# Patient Record
Sex: MALE | Race: WHITE | Employment: OTHER | URBAN - NONMETROPOLITAN AREA
[De-identification: names, ages, dates, MRNs, and addresses within clinical notes are randomized per-mention and may not be internally consistent; named-entity substitution may affect disease eponyms.]

---

## 2017-05-23 ENCOUNTER — DOCTOR'S OFFICE (OUTPATIENT)
Dept: URBAN - NONMETROPOLITAN AREA CLINIC 1 | Facility: CLINIC | Age: 57
Setting detail: OPHTHALMOLOGY
End: 2017-05-23
Payer: COMMERCIAL

## 2017-05-23 DIAGNOSIS — H25.13: ICD-10-CM

## 2017-05-23 DIAGNOSIS — H21.233: ICD-10-CM

## 2017-05-23 PROCEDURE — 92004 COMPRE OPH EXAM NEW PT 1/>: CPT | Performed by: OPHTHALMOLOGY

## 2017-05-23 PROCEDURE — 76514 ECHO EXAM OF EYE THICKNESS: CPT | Performed by: OPHTHALMOLOGY

## 2017-05-23 PROCEDURE — 92083 EXTENDED VISUAL FIELD XM: CPT | Performed by: OPHTHALMOLOGY

## 2017-05-23 ASSESSMENT — REFRACTION_AUTOREFRACTION
OS_SPHERE: +1.25
OS_CYLINDER: -0.50
OD_CYLINDER: -0.25
OD_SPHERE: +1.00
OD_AXIS: 125
OS_AXIS: 75

## 2017-05-23 ASSESSMENT — CONFRONTATIONAL VISUAL FIELD TEST (CVF)
OS_FINDINGS: FULL
OD_FINDINGS: FULL

## 2017-05-23 ASSESSMENT — REFRACTION_CURRENTRX
OD_OVR_VA: 20/
OS_SPHERE: +0.50
OD_OVR_VA: 20/
OS_ADD: +2.50
OS_OVR_VA: 20/
OS_AXIS: 180
OS_OVR_VA: 20/
OD_OVR_VA: 20/
OD_CYLINDER: -0.50
OD_ADD: +2.50
OD_AXIS: 116
OS_OVR_VA: 20/
OS_CYLINDER: 0.00
OD_SPHERE: +0.50

## 2017-05-23 ASSESSMENT — VISUAL ACUITY
OD_BCVA: 20/25
OS_BCVA: 20/25

## 2017-05-23 ASSESSMENT — REFRACTION_MANIFEST
OS_VA2: 20/
OS_VA3: 20/
OU_VA: 20/
OS_VA2: 20/
OD_VA1: 20/
OS_VA2: 20/
OS_VA1: 20/
OS_VA1: 20/
OU_VA: 20/
OS_VA3: 20/
OD_VA2: 20/
OS_VA1: 20/
OS_VA3: 20/
OD_VA3: 20/
OD_VA2: 20/
OD_VA1: 20/
OD_VA2: 20/
OD_VA3: 20/
OD_VA1: 20/
OU_VA: 20/
OD_VA3: 20/

## 2017-05-23 ASSESSMENT — PACHYMETRY
OD_CT_CORRECTION: 1
OS_CT_UM: 530
OD_CT_UM: 538
OS_CT_CORRECTION: 1

## 2017-05-23 ASSESSMENT — SPHEQUIV_DERIVED
OD_SPHEQUIV: 0.875
OS_SPHEQUIV: 1

## 2017-09-27 ENCOUNTER — DOCTOR'S OFFICE (OUTPATIENT)
Dept: URBAN - NONMETROPOLITAN AREA CLINIC 1 | Facility: CLINIC | Age: 57
Setting detail: OPHTHALMOLOGY
End: 2017-09-27
Payer: COMMERCIAL

## 2017-09-27 ENCOUNTER — RX ONLY (RX ONLY)
Age: 57
End: 2017-09-27

## 2017-09-27 DIAGNOSIS — H01.9: ICD-10-CM

## 2017-09-27 PROCEDURE — 92012 INTRM OPH EXAM EST PATIENT: CPT | Performed by: OPTOMETRIST

## 2017-09-27 ASSESSMENT — REFRACTION_MANIFEST
OD_VA2: 20/
OD_VA3: 20/
OS_VA2: 20/
OD_VA2: 20/
OS_VA1: 20/
OS_VA2: 20/
OU_VA: 20/
OD_VA3: 20/
OU_VA: 20/
OD_VA3: 20/
OD_VA1: 20/
OD_VA1: 20/
OS_VA3: 20/
OD_VA2: 20/
OS_VA2: 20/
OS_VA3: 20/
OD_VA1: 20/
OS_VA1: 20/
OS_VA3: 20/
OU_VA: 20/
OS_VA1: 20/

## 2017-09-27 ASSESSMENT — VISUAL ACUITY
OD_BCVA: 20/20
OS_BCVA: 20/20-1

## 2017-09-27 ASSESSMENT — REFRACTION_CURRENTRX
OS_OVR_VA: 20/
OD_OVR_VA: 20/
OD_ADD: +2.25
OD_SPHERE: +0.50
OS_CYLINDER: -0.25
OD_OVR_VA: 20/
OS_OVR_VA: 20/
OD_VPRISM_DIRECTION: BF
OS_ADD: +2.25
OD_CYLINDER: -0.50
OD_AXIS: 110
OD_OVR_VA: 20/
OS_AXIS: 110
OS_VPRISM_DIRECTION: BF
OS_SPHERE: +0.50
OS_OVR_VA: 20/

## 2017-09-27 ASSESSMENT — REFRACTION_AUTOREFRACTION
OS_AXIS: 8
OD_SPHERE: +0.50
OD_CYLINDER: -0.25
OS_CYLINDER: -0.25
OD_AXIS: 148
OS_SPHERE: +0.75

## 2017-09-27 ASSESSMENT — LID EXAM ASSESSMENTS
OD_MEIBOMITIS: RUL 1+ 2+
OS_MEIBOMITIS: LUL 1+ 2+

## 2017-09-27 ASSESSMENT — CONFRONTATIONAL VISUAL FIELD TEST (CVF)
OS_FINDINGS: FULL
OD_FINDINGS: FULL

## 2017-09-27 ASSESSMENT — SPHEQUIV_DERIVED
OD_SPHEQUIV: 0.375
OS_SPHEQUIV: 0.625

## 2017-11-21 ENCOUNTER — DOCTOR'S OFFICE (OUTPATIENT)
Dept: URBAN - NONMETROPOLITAN AREA CLINIC 1 | Facility: CLINIC | Age: 57
Setting detail: OPHTHALMOLOGY
End: 2017-11-21
Payer: COMMERCIAL

## 2017-11-21 DIAGNOSIS — H01.9: ICD-10-CM

## 2017-11-21 DIAGNOSIS — H10.13: ICD-10-CM

## 2017-11-21 PROCEDURE — 92012 INTRM OPH EXAM EST PATIENT: CPT | Performed by: OPTOMETRIST

## 2017-11-21 ASSESSMENT — SPHEQUIV_DERIVED
OS_SPHEQUIV: 0.625
OD_SPHEQUIV: 0.375

## 2017-11-21 ASSESSMENT — REFRACTION_MANIFEST
OS_VA3: 20/
OD_VA3: 20/
OD_VA2: 20/
OS_VA2: 20/
OS_VA3: 20/
OU_VA: 20/
OS_VA2: 20/
OS_VA1: 20/
OD_VA1: 20/
OS_VA1: 20/
OD_VA2: 20/
OD_VA1: 20/
OU_VA: 20/
OD_VA3: 20/
OD_VA3: 20/
OD_VA2: 20/
OS_VA3: 20/
OS_VA2: 20/
OS_VA1: 20/
OU_VA: 20/
OD_VA1: 20/

## 2017-11-21 ASSESSMENT — VISUAL ACUITY
OS_BCVA: 20/20-1
OD_BCVA: 20/25-2

## 2017-11-21 ASSESSMENT — REFRACTION_CURRENTRX
OS_OVR_VA: 20/
OS_AXIS: 110
OS_CYLINDER: -0.25
OD_CYLINDER: -0.50
OS_ADD: +2.25
OD_OVR_VA: 20/
OS_OVR_VA: 20/
OD_AXIS: 110
OD_OVR_VA: 20/
OS_SPHERE: +0.50
OD_OVR_VA: 20/
OD_ADD: +2.25
OD_SPHERE: +0.50
OD_VPRISM_DIRECTION: BF
OS_OVR_VA: 20/
OS_VPRISM_DIRECTION: BF

## 2017-11-21 ASSESSMENT — REFRACTION_AUTOREFRACTION
OS_SPHERE: +0.75
OS_CYLINDER: -0.25
OS_AXIS: 8
OD_CYLINDER: -0.25
OD_SPHERE: +0.50
OD_AXIS: 148

## 2017-11-21 ASSESSMENT — CONFRONTATIONAL VISUAL FIELD TEST (CVF)
OD_FINDINGS: FULL
OS_FINDINGS: FULL

## 2017-12-12 ENCOUNTER — DOCTOR'S OFFICE (OUTPATIENT)
Dept: URBAN - NONMETROPOLITAN AREA CLINIC 1 | Facility: CLINIC | Age: 57
Setting detail: OPHTHALMOLOGY
End: 2017-12-12
Payer: COMMERCIAL

## 2017-12-12 DIAGNOSIS — H01.9: ICD-10-CM

## 2017-12-12 DIAGNOSIS — H10.13: ICD-10-CM

## 2017-12-12 PROCEDURE — 92012 INTRM OPH EXAM EST PATIENT: CPT | Performed by: OPTOMETRIST

## 2017-12-12 ASSESSMENT — REFRACTION_AUTOREFRACTION
OS_CYLINDER: -0.25
OS_AXIS: 8
OD_SPHERE: +0.50
OS_SPHERE: +0.75
OD_CYLINDER: -0.25
OD_AXIS: 148

## 2017-12-12 ASSESSMENT — REFRACTION_MANIFEST
OD_VA2: 20/
OS_VA3: 20/
OU_VA: 20/
OS_VA2: 20/
OU_VA: 20/
OD_VA3: 20/
OD_VA1: 20/
OS_VA1: 20/
OD_VA2: 20/
OU_VA: 20/
OS_VA2: 20/
OD_VA3: 20/
OS_VA1: 20/
OS_VA3: 20/
OS_VA3: 20/
OD_VA1: 20/
OD_VA2: 20/
OS_VA1: 20/
OS_VA2: 20/
OD_VA3: 20/
OD_VA1: 20/

## 2017-12-12 ASSESSMENT — SPHEQUIV_DERIVED
OD_SPHEQUIV: 0.375
OS_SPHEQUIV: 0.625

## 2017-12-12 ASSESSMENT — REFRACTION_CURRENTRX
OS_ADD: +2.25
OS_CYLINDER: -0.25
OS_OVR_VA: 20/
OD_VPRISM_DIRECTION: BF
OS_VPRISM_DIRECTION: BF
OS_AXIS: 110
OD_CYLINDER: -0.50
OD_SPHERE: +0.50
OS_SPHERE: +0.50
OS_OVR_VA: 20/
OD_AXIS: 110
OD_OVR_VA: 20/
OD_OVR_VA: 20/
OS_OVR_VA: 20/
OD_ADD: +2.25
OD_OVR_VA: 20/

## 2017-12-12 ASSESSMENT — VISUAL ACUITY
OD_BCVA: 20/25-2
OS_BCVA: 20/20-1

## 2017-12-12 ASSESSMENT — CONFRONTATIONAL VISUAL FIELD TEST (CVF)
OS_FINDINGS: FULL
OD_FINDINGS: FULL

## 2018-06-29 ENCOUNTER — DOCTOR'S OFFICE (OUTPATIENT)
Dept: URBAN - NONMETROPOLITAN AREA CLINIC 1 | Facility: CLINIC | Age: 58
Setting detail: OPHTHALMOLOGY
End: 2018-06-29
Payer: COMMERCIAL

## 2018-06-29 DIAGNOSIS — H21.233: ICD-10-CM

## 2018-06-29 DIAGNOSIS — H01.9: ICD-10-CM

## 2018-06-29 DIAGNOSIS — H25.13: ICD-10-CM

## 2018-06-29 DIAGNOSIS — H04.123: ICD-10-CM

## 2018-06-29 DIAGNOSIS — H04.122: ICD-10-CM

## 2018-06-29 PROCEDURE — 76514 ECHO EXAM OF EYE THICKNESS: CPT | Performed by: OPHTHALMOLOGY

## 2018-06-29 PROCEDURE — 92133 CPTRZD OPH DX IMG PST SGM ON: CPT | Performed by: OPHTHALMOLOGY

## 2018-06-29 PROCEDURE — 92014 COMPRE OPH EXAM EST PT 1/>: CPT | Performed by: OPHTHALMOLOGY

## 2018-06-29 PROCEDURE — 83861 MICROFLUID ANALY TEARS: CPT | Performed by: OPHTHALMOLOGY

## 2018-06-29 ASSESSMENT — REFRACTION_CURRENTRX
OS_OVR_VA: 20/
OS_AXIS: 110
OD_VPRISM_DIRECTION: BF
OS_OVR_VA: 20/
OS_SPHERE: +0.50
OS_CYLINDER: -0.25
OD_OVR_VA: 20/
OS_VPRISM_DIRECTION: BF
OD_AXIS: 110
OS_OVR_VA: 20/
OD_CYLINDER: -0.50
OD_ADD: +2.25
OD_SPHERE: +0.50
OS_ADD: +2.25
OD_OVR_VA: 20/
OD_OVR_VA: 20/

## 2018-06-29 ASSESSMENT — REFRACTION_MANIFEST
OU_VA: 20/
OS_VA3: 20/
OS_VA2: 20/
OD_VA2: 20/
OD_VA3: 20/
OD_VA2: 20/
OS_VA1: 20/
OS_VA2: 20/
OD_VA1: 20/
OD_VA3: 20/
OS_VA3: 20/
OD_VA3: 20/
OS_VA1: 20/
OS_VA1: 20/
OD_VA2: 20/
OU_VA: 20/
OS_VA3: 20/
OU_VA: 20/
OD_VA1: 20/
OS_VA2: 20/
OD_VA1: 20/

## 2018-06-29 ASSESSMENT — REFRACTION_AUTOREFRACTION
OS_SPHERE: +0.75
OD_CYLINDER: -0.25
OS_AXIS: 8
OD_AXIS: 148
OD_SPHERE: +0.50
OS_CYLINDER: -0.25

## 2018-06-29 ASSESSMENT — SPHEQUIV_DERIVED
OD_SPHEQUIV: 0.375
OS_SPHEQUIV: 0.625

## 2018-06-29 ASSESSMENT — PACHYMETRY
OD_CT_CORRECTION: 1
OD_CT_UM: 538
OS_CT_CORRECTION: 1
OS_CT_UM: 530

## 2018-06-29 ASSESSMENT — VISUAL ACUITY
OS_BCVA: 20/20-1
OD_BCVA: 20/25-2

## 2018-06-29 ASSESSMENT — CONFRONTATIONAL VISUAL FIELD TEST (CVF)
OD_FINDINGS: FULL
OS_FINDINGS: FULL

## 2018-07-03 ENCOUNTER — DOCTOR'S OFFICE (OUTPATIENT)
Dept: URBAN - NONMETROPOLITAN AREA CLINIC 1 | Facility: CLINIC | Age: 58
Setting detail: OPHTHALMOLOGY
End: 2018-07-03
Payer: COMMERCIAL

## 2018-07-03 ENCOUNTER — OPTICAL OFFICE (OUTPATIENT)
Dept: URBAN - NONMETROPOLITAN AREA CLINIC 4 | Facility: CLINIC | Age: 58
Setting detail: OPHTHALMOLOGY
End: 2018-07-03

## 2018-07-03 DIAGNOSIS — H52.4: ICD-10-CM

## 2018-07-03 DIAGNOSIS — H52.03: ICD-10-CM

## 2018-07-03 DIAGNOSIS — H52.223: ICD-10-CM

## 2018-07-03 PROCEDURE — V2020 VISION SVCS FRAMES PURCHASES: HCPCS | Performed by: OPTOMETRIST

## 2018-07-03 PROCEDURE — V2203 LENS SPHCYL BIFOCAL 4.00D/.1: HCPCS | Performed by: OPTOMETRIST

## 2018-07-03 PROCEDURE — 92015 DETERMINE REFRACTIVE STATE: CPT | Performed by: OPTOMETRIST

## 2018-07-03 PROCEDURE — V2744 TINT PHOTOCHROMATIC LENS/ES: HCPCS | Performed by: OPTOMETRIST

## 2018-07-03 ASSESSMENT — VISUAL ACUITY
OS_BCVA: 20/20-1
OD_BCVA: 20/25-2

## 2018-07-03 ASSESSMENT — REFRACTION_AUTOREFRACTION
OD_SPHERE: +0.25
OS_SPHERE: +1.00
OS_AXIS: 008
OS_CYLINDER: 0.00
OD_AXIS: 148
OD_CYLINDER: 0.00

## 2018-07-03 ASSESSMENT — REFRACTION_CURRENTRX
OD_OVR_VA: 20/
OS_OVR_VA: 20/
OS_CYLINDER: -0.25
OD_OVR_VA: 20/
OD_VPRISM_DIRECTION: BF
OD_AXIS: 110
OS_SPHERE: +0.50
OS_OVR_VA: 20/
OS_AXIS: 110
OS_OVR_VA: 20/
OD_CYLINDER: -0.50
OD_SPHERE: +0.50
OS_ADD: +2.25
OD_OVR_VA: 20/
OS_VPRISM_DIRECTION: BF
OD_ADD: +2.25

## 2018-07-03 ASSESSMENT — REFRACTION_OUTSIDERX
OS_CYLINDER: -0.25
OD_CYLINDER: -0.25
OS_AXIS: 090
OD_VA1: 20/20-1
OD_ADD: +2.50
OS_VA1: 20/20-1
OS_VA2: 20/20-1
OS_ADD: +2.50
OD_VA3: 20/
OS_VA3: 20/
OD_AXIS: 115
OU_VA: 20/
OD_VA2: 20/20-1
OS_SPHERE: +0.75
OD_SPHERE: +0.25

## 2018-07-03 ASSESSMENT — REFRACTION_MANIFEST
OU_VA: 20/
OS_VA3: 20/
OD_VA1: 20/
OS_VA2: 20/
OS_VA1: 20/
OD_VA1: 20/
OD_VA2: 20/
OD_VA2: 20/
OD_VA3: 20/
OS_VA3: 20/
OS_VA2: 20/
OU_VA: 20/
OD_VA3: 20/
OS_VA1: 20/

## 2018-07-03 ASSESSMENT — SPHEQUIV_DERIVED
OD_SPHEQUIV: 0.25
OS_SPHEQUIV: 1

## 2018-07-23 ENCOUNTER — OPTICAL OFFICE (OUTPATIENT)
Dept: URBAN - NONMETROPOLITAN AREA CLINIC 4 | Facility: CLINIC | Age: 58
Setting detail: OPHTHALMOLOGY
End: 2018-07-23

## 2018-07-23 DIAGNOSIS — H52.7: ICD-10-CM

## 2018-07-23 PROCEDURE — V2799T TAXABLE VISION SERVICE MISCELLANEOUS: Performed by: OPTOMETRIST

## 2018-11-20 ENCOUNTER — DOCTOR'S OFFICE (OUTPATIENT)
Dept: URBAN - NONMETROPOLITAN AREA CLINIC 1 | Facility: CLINIC | Age: 58
Setting detail: OPHTHALMOLOGY
End: 2018-11-20
Payer: COMMERCIAL

## 2018-11-20 DIAGNOSIS — H04.123: ICD-10-CM

## 2018-11-20 DIAGNOSIS — H01.9: ICD-10-CM

## 2018-11-20 DIAGNOSIS — H21.233: ICD-10-CM

## 2018-11-20 DIAGNOSIS — H04.122: ICD-10-CM

## 2018-11-20 DIAGNOSIS — H25.13: ICD-10-CM

## 2018-11-20 PROCEDURE — 92083 EXTENDED VISUAL FIELD XM: CPT | Performed by: OPHTHALMOLOGY

## 2018-11-20 PROCEDURE — 83861 MICROFLUID ANALY TEARS: CPT | Performed by: OPHTHALMOLOGY

## 2018-11-20 PROCEDURE — 92014 COMPRE OPH EXAM EST PT 1/>: CPT | Performed by: OPHTHALMOLOGY

## 2018-11-20 ASSESSMENT — REFRACTION_MANIFEST
OS_VA3: 20/
OD_VA1: 20/
OS_VA1: 20/
OU_VA: 20/
OU_VA: 20/
OS_VA1: 20/20-1
OD_ADD: +2.50
OS_CYLINDER: -0.25
OD_VA3: 20/
OD_AXIS: 115
OS_VA2: 20/20-1
OD_VA2: 20/
OD_VA3: 20/
OD_VA1: 20/20-1
OD_SPHERE: +0.25
OS_SPHERE: +0.75
OD_VA2: 20/20-1
OD_CYLINDER: -0.25
OS_AXIS: 090
OS_VA3: 20/
OS_VA2: 20/
OS_ADD: +2.50

## 2018-11-20 ASSESSMENT — REFRACTION_CURRENTRX
OS_AXIS: 078
OD_OVR_VA: 20/
OD_OVR_VA: 20/
OD_SPHERE: +0.25
OS_OVR_VA: 20/
OD_ADD: +2.75
OS_ADD: +2.75
OS_OVR_VA: 20/
OS_OVR_VA: 20/
OD_AXIS: 117
OS_SPHERE: +0.50
OD_CYLINDER: -0.25
OD_OVR_VA: 20/
OS_VPRISM_DIRECTION: BF
OD_VPRISM_DIRECTION: BF
OS_CYLINDER: -0.25

## 2018-11-20 ASSESSMENT — SPHEQUIV_DERIVED
OS_SPHEQUIV: 0.75
OS_SPHEQUIV: 0.625
OD_SPHEQUIV: 0.125
OD_SPHEQUIV: 0.5

## 2018-11-20 ASSESSMENT — SUPERFICIAL PUNCTATE KERATITIS (SPK)
OS_SPK: 1+
OD_SPK: 1+

## 2018-11-20 ASSESSMENT — CONFRONTATIONAL VISUAL FIELD TEST (CVF)
OD_FINDINGS: FULL
OS_FINDINGS: FULL

## 2018-11-20 ASSESSMENT — REFRACTION_AUTOREFRACTION
OS_SPHERE: +0.75
OS_CYLINDER: 0.00
OD_CYLINDER: 0.00
OS_AXIS: 180
OD_SPHERE: +0.50
OD_AXIS: 180

## 2018-11-20 ASSESSMENT — VISUAL ACUITY
OS_BCVA: 20/20-1
OD_BCVA: 20/20-1

## 2019-05-14 ENCOUNTER — DOCTOR'S OFFICE (OUTPATIENT)
Dept: URBAN - NONMETROPOLITAN AREA CLINIC 1 | Facility: CLINIC | Age: 59
Setting detail: OPHTHALMOLOGY
End: 2019-05-14
Payer: COMMERCIAL

## 2019-05-14 DIAGNOSIS — H01.005: ICD-10-CM

## 2019-05-14 DIAGNOSIS — H01.001: ICD-10-CM

## 2019-05-14 DIAGNOSIS — H52.4: ICD-10-CM

## 2019-05-14 DIAGNOSIS — H01.004: ICD-10-CM

## 2019-05-14 DIAGNOSIS — H01.002: ICD-10-CM

## 2019-05-14 PROCEDURE — 99214 OFFICE O/P EST MOD 30 MIN: CPT | Performed by: OPHTHALMOLOGY

## 2019-05-14 PROCEDURE — 92015 DETERMINE REFRACTIVE STATE: CPT | Performed by: OPHTHALMOLOGY

## 2019-05-14 ASSESSMENT — REFRACTION_MANIFEST
OS_VA3: 20/
OS_CYLINDER: -0.25
OU_VA: 20/
OS_VA2: 20/20-1
OS_ADD: +2.50
OS_VA1: 20/20-1
OD_VA3: 20/
OD_CYLINDER: -0.25
OD_VA1: 20/
OU_VA: 20/
OD_AXIS: 115
OD_SPHERE: +0.25
OD_VA1: 20/20-1
OS_SPHERE: +0.75
OS_VA2: 20/
OD_VA2: 20/
OS_VA3: 20/
OD_VA2: 20/20-1
OS_AXIS: 090
OD_ADD: +2.50
OD_VA3: 20/
OS_VA1: 20/

## 2019-05-14 ASSESSMENT — SUPERFICIAL PUNCTATE KERATITIS (SPK)
OS_SPK: ABSENT
OD_SPK: ABSENT

## 2019-05-14 ASSESSMENT — LID EXAM ASSESSMENTS
OD_COMMENTS: 1+ MGD
OD_BLEPHARITIS: RLL RUL 1+
OS_BLEPHARITIS: LLL LUL 1+
OS_COMMENTS: 1+ MGD

## 2019-05-14 ASSESSMENT — SPHEQUIV_DERIVED
OD_SPHEQUIV: 0.125
OS_SPHEQUIV: 0.625

## 2019-05-15 ASSESSMENT — REFRACTION_CURRENTRX
OS_CYLINDER: -0.25
OS_OVR_VA: 20/
OS_VPRISM_DIRECTION: BF
OS_OVR_VA: 20/
OS_OVR_VA: 20/
OD_AXIS: 117
OD_VPRISM_DIRECTION: BF
OS_AXIS: 078
OD_OVR_VA: 20/
OD_OVR_VA: 20/
OD_SPHERE: +0.25
OS_SPHERE: +0.50
OD_CYLINDER: -0.25
OD_ADD: +2.75
OD_OVR_VA: 20/
OS_ADD: +2.75

## 2019-05-15 ASSESSMENT — VISUAL ACUITY
OD_BCVA: 20/20-1
OS_BCVA: 20/20-1

## 2019-05-15 ASSESSMENT — REFRACTION_AUTOREFRACTION
OD_CYLINDER: 0.00
OD_SPHERE: +0.50
OS_AXIS: 180
OS_SPHERE: +0.75
OS_CYLINDER: 0.00
OD_AXIS: 180

## 2019-05-15 ASSESSMENT — SPHEQUIV_DERIVED
OD_SPHEQUIV: 0.5
OS_SPHEQUIV: 0.75

## 2019-05-22 ENCOUNTER — DOCTOR'S OFFICE (OUTPATIENT)
Dept: URBAN - NONMETROPOLITAN AREA CLINIC 1 | Facility: CLINIC | Age: 59
Setting detail: OPHTHALMOLOGY
End: 2019-05-22
Payer: COMMERCIAL

## 2019-05-22 DIAGNOSIS — H21.233: ICD-10-CM

## 2019-05-22 DIAGNOSIS — H53.19: ICD-10-CM

## 2019-05-22 DIAGNOSIS — H01.9: ICD-10-CM

## 2019-05-22 DIAGNOSIS — H40.013: ICD-10-CM

## 2019-05-22 DIAGNOSIS — H01.001: ICD-10-CM

## 2019-05-22 DIAGNOSIS — H25.13: ICD-10-CM

## 2019-05-22 DIAGNOSIS — H01.002: ICD-10-CM

## 2019-05-22 DIAGNOSIS — H01.005: ICD-10-CM

## 2019-05-22 DIAGNOSIS — H04.123: ICD-10-CM

## 2019-05-22 DIAGNOSIS — H01.004: ICD-10-CM

## 2019-05-22 PROCEDURE — 76514 ECHO EXAM OF EYE THICKNESS: CPT | Performed by: OPHTHALMOLOGY

## 2019-05-22 PROCEDURE — 92014 COMPRE OPH EXAM EST PT 1/>: CPT | Performed by: OPHTHALMOLOGY

## 2019-05-22 PROCEDURE — 92133 CPTRZD OPH DX IMG PST SGM ON: CPT | Performed by: OPHTHALMOLOGY

## 2019-05-22 ASSESSMENT — PACHYMETRY
OD_CT_UM: 538
OD_CT_CORRECTION: 1
OS_CT_UM: 530
OS_CT_CORRECTION: 1

## 2019-05-22 ASSESSMENT — REFRACTION_AUTOREFRACTION
OD_CYLINDER: 0.00
OS_CYLINDER: 0.00
OS_SPHERE: +0.75
OD_AXIS: 180
OD_SPHERE: +0.50
OS_AXIS: 180

## 2019-05-22 ASSESSMENT — SPHEQUIV_DERIVED
OS_SPHEQUIV: 0.75
OD_SPHEQUIV: 0.5
OS_SPHEQUIV: 0.625
OD_SPHEQUIV: 0.125

## 2019-05-22 ASSESSMENT — REFRACTION_MANIFEST
OS_CYLINDER: -0.25
OD_AXIS: 115
OD_ADD: +2.50
OD_VA3: 20/
OD_VA3: 20/
OU_VA: 20/
OS_VA3: 20/
OS_AXIS: 090
OS_ADD: +2.50
OD_VA1: 20/
OD_VA1: 20/20-1
OD_VA2: 20/
OS_VA1: 20/20-1
OS_VA2: 20/
OS_VA1: 20/
OS_SPHERE: +0.75
OD_VA2: 20/20-1
OU_VA: 20/
OD_CYLINDER: -0.25
OS_VA2: 20/20-1
OS_VA3: 20/
OD_SPHERE: +0.25

## 2019-05-22 ASSESSMENT — REFRACTION_CURRENTRX
OS_OVR_VA: 20/
OD_OVR_VA: 20/
OD_AXIS: 117
OD_SPHERE: +0.25
OD_ADD: +2.75
OS_ADD: +2.75
OS_AXIS: 078
OD_OVR_VA: 20/
OD_CYLINDER: -0.25
OS_CYLINDER: -0.25
OS_OVR_VA: 20/
OS_OVR_VA: 20/
OD_OVR_VA: 20/
OS_VPRISM_DIRECTION: BF
OD_VPRISM_DIRECTION: BF
OS_SPHERE: +0.50

## 2019-05-22 ASSESSMENT — LID EXAM ASSESSMENTS
OD_COMMENTS: 1+ MGD
OD_BLEPHARITIS: RLL RUL 1+
OS_COMMENTS: 1+ MGD
OS_BLEPHARITIS: LLL LUL 1+

## 2019-05-22 ASSESSMENT — SUPERFICIAL PUNCTATE KERATITIS (SPK)
OS_SPK: ABSENT
OD_SPK: ABSENT

## 2019-05-22 ASSESSMENT — VISUAL ACUITY
OD_BCVA: 20/25
OS_BCVA: 20/25

## 2019-05-22 ASSESSMENT — CONFRONTATIONAL VISUAL FIELD TEST (CVF)
OS_FINDINGS: FULL
OD_FINDINGS: FULL

## 2019-06-06 ENCOUNTER — DOCTOR'S OFFICE (OUTPATIENT)
Dept: URBAN - NONMETROPOLITAN AREA CLINIC 1 | Facility: CLINIC | Age: 59
Setting detail: OPHTHALMOLOGY
End: 2019-06-06
Payer: COMMERCIAL

## 2019-06-06 DIAGNOSIS — H43.813: ICD-10-CM

## 2019-06-06 DIAGNOSIS — H43.812: ICD-10-CM

## 2019-06-06 DIAGNOSIS — H43.811: ICD-10-CM

## 2019-06-06 PROCEDURE — 92014 COMPRE OPH EXAM EST PT 1/>: CPT | Performed by: OPHTHALMOLOGY

## 2019-06-06 PROCEDURE — 92226 OPHTHALMOSCOPY EXT SUBSEQUENT: CPT | Performed by: OPHTHALMOLOGY

## 2019-06-06 PROCEDURE — 92225 OPHTHALMOSCOPY EXTENDED INITIAL: CPT | Performed by: OPHTHALMOLOGY

## 2019-06-06 ASSESSMENT — LID EXAM ASSESSMENTS
OS_COMMENTS: 1+ MGD
OD_BLEPHARITIS: RLL RUL 1+
OS_BLEPHARITIS: LLL LUL 1+
OD_COMMENTS: 1+ MGD

## 2019-06-06 ASSESSMENT — SPHEQUIV_DERIVED
OS_SPHEQUIV: 0.625
OD_SPHEQUIV: 0.5
OD_SPHEQUIV: 0.125
OS_SPHEQUIV: 0.75

## 2019-06-06 ASSESSMENT — REFRACTION_MANIFEST
OD_AXIS: 115
OD_CYLINDER: -0.25
OS_CYLINDER: -0.25
OS_ADD: +2.50
OD_ADD: +2.50
OS_VA1: 20/20-1
OS_SPHERE: +0.75
OS_AXIS: 090
OS_VA3: 20/
OD_VA2: 20/
OD_VA3: 20/
OU_VA: 20/
OS_VA2: 20/20-1
OU_VA: 20/
OD_SPHERE: +0.25
OD_VA1: 20/20-1
OS_VA2: 20/
OD_VA1: 20/
OD_VA3: 20/
OS_VA1: 20/
OD_VA2: 20/20-1
OS_VA3: 20/

## 2019-06-06 ASSESSMENT — REFRACTION_CURRENTRX
OD_CYLINDER: -0.25
OS_OVR_VA: 20/
OS_VPRISM_DIRECTION: BF
OS_SPHERE: +0.50
OD_AXIS: 117
OS_OVR_VA: 20/
OD_ADD: +2.75
OS_ADD: +2.75
OD_OVR_VA: 20/
OD_OVR_VA: 20/
OS_CYLINDER: -0.25
OS_AXIS: 078
OS_OVR_VA: 20/
OD_VPRISM_DIRECTION: BF
OD_OVR_VA: 20/
OD_SPHERE: +0.25

## 2019-06-06 ASSESSMENT — REFRACTION_AUTOREFRACTION
OS_CYLINDER: 0.00
OS_AXIS: 180
OD_CYLINDER: 0.00
OD_SPHERE: +0.50
OD_AXIS: 180
OS_SPHERE: +0.75

## 2019-06-06 ASSESSMENT — CONFRONTATIONAL VISUAL FIELD TEST (CVF)
OS_FINDINGS: FULL
OD_FINDINGS: FULL

## 2019-06-06 ASSESSMENT — SUPERFICIAL PUNCTATE KERATITIS (SPK)
OS_SPK: ABSENT
OD_SPK: ABSENT

## 2019-06-06 ASSESSMENT — VISUAL ACUITY
OD_BCVA: 20/25-1
OS_BCVA: 20/20-1

## 2019-06-21 ENCOUNTER — DOCTOR'S OFFICE (OUTPATIENT)
Dept: URBAN - NONMETROPOLITAN AREA CLINIC 1 | Facility: CLINIC | Age: 59
Setting detail: OPHTHALMOLOGY
End: 2019-06-21
Payer: COMMERCIAL

## 2019-06-21 DIAGNOSIS — H40.013: ICD-10-CM

## 2019-06-21 DIAGNOSIS — H43.813: ICD-10-CM

## 2019-06-21 DIAGNOSIS — H21.233: ICD-10-CM

## 2019-06-21 DIAGNOSIS — H43.811: ICD-10-CM

## 2019-06-21 DIAGNOSIS — H43.812: ICD-10-CM

## 2019-06-21 DIAGNOSIS — H25.13: ICD-10-CM

## 2019-06-21 PROCEDURE — 92014 COMPRE OPH EXAM EST PT 1/>: CPT | Performed by: OPHTHALMOLOGY

## 2019-06-21 PROCEDURE — 92226 OPHTHALMOSCOPY EXT SUBSEQUENT: CPT | Performed by: OPHTHALMOLOGY

## 2019-06-21 PROCEDURE — 92134 CPTRZ OPH DX IMG PST SGM RTA: CPT | Performed by: OPHTHALMOLOGY

## 2019-06-21 ASSESSMENT — LID EXAM ASSESSMENTS
OS_BLEPHARITIS: LLL LUL 1+
OD_BLEPHARITIS: RLL RUL 1+
OS_COMMENTS: 1+ MGD
OD_COMMENTS: 1+ MGD

## 2019-06-21 ASSESSMENT — REFRACTION_CURRENTRX
OD_OVR_VA: 20/
OS_OVR_VA: 20/
OS_AXIS: 078
OS_ADD: +2.75
OD_CYLINDER: -0.25
OS_CYLINDER: -0.25
OS_OVR_VA: 20/
OD_OVR_VA: 20/
OS_SPHERE: +0.50
OD_SPHERE: +0.25
OS_VPRISM_DIRECTION: BF
OD_AXIS: 117
OD_VPRISM_DIRECTION: BF
OD_OVR_VA: 20/
OS_OVR_VA: 20/
OD_ADD: +2.75

## 2019-06-21 ASSESSMENT — REFRACTION_MANIFEST
OS_VA1: 20/
OU_VA: 20/
OS_SPHERE: +0.75
OS_VA3: 20/
OD_SPHERE: +0.25
OS_VA3: 20/
OU_VA: 20/
OS_CYLINDER: -0.25
OS_VA2: 20/
OD_VA3: 20/
OS_ADD: +2.50
OD_VA1: 20/
OD_VA2: 20/20-1
OD_VA1: 20/20-1
OD_VA2: 20/
OS_AXIS: 090
OD_ADD: +2.50
OD_AXIS: 115
OD_VA3: 20/
OD_CYLINDER: -0.25
OS_VA2: 20/20-1
OS_VA1: 20/20-1

## 2019-06-21 ASSESSMENT — REFRACTION_AUTOREFRACTION
OS_CYLINDER: 0.00
OD_SPHERE: +0.50
OD_CYLINDER: 0.00
OD_AXIS: 180
OS_SPHERE: +0.75
OS_AXIS: 180

## 2019-06-21 ASSESSMENT — VISUAL ACUITY
OD_BCVA: 20/25+1
OS_BCVA: 20/20

## 2019-06-21 ASSESSMENT — SPHEQUIV_DERIVED
OS_SPHEQUIV: 0.625
OS_SPHEQUIV: 0.75
OD_SPHEQUIV: 0.5
OD_SPHEQUIV: 0.125

## 2019-06-21 ASSESSMENT — CONFRONTATIONAL VISUAL FIELD TEST (CVF)
OD_FINDINGS: FULL
OS_FINDINGS: FULL

## 2019-06-21 ASSESSMENT — SUPERFICIAL PUNCTATE KERATITIS (SPK)
OD_SPK: ABSENT
OS_SPK: ABSENT

## 2019-08-01 ENCOUNTER — DOCTOR'S OFFICE (OUTPATIENT)
Dept: URBAN - NONMETROPOLITAN AREA CLINIC 1 | Facility: CLINIC | Age: 59
Setting detail: OPHTHALMOLOGY
End: 2019-08-01
Payer: COMMERCIAL

## 2019-08-01 DIAGNOSIS — H21.233: ICD-10-CM

## 2019-08-01 DIAGNOSIS — H25.13: ICD-10-CM

## 2019-08-01 DIAGNOSIS — H04.122: ICD-10-CM

## 2019-08-01 DIAGNOSIS — H43.813: ICD-10-CM

## 2019-08-01 DIAGNOSIS — H04.123: ICD-10-CM

## 2019-08-01 PROCEDURE — 92134 CPTRZ OPH DX IMG PST SGM RTA: CPT | Performed by: OPHTHALMOLOGY

## 2019-08-01 PROCEDURE — 92014 COMPRE OPH EXAM EST PT 1/>: CPT | Performed by: OPHTHALMOLOGY

## 2019-08-01 PROCEDURE — 83861 MICROFLUID ANALY TEARS: CPT | Performed by: OPHTHALMOLOGY

## 2019-08-01 ASSESSMENT — REFRACTION_MANIFEST
OD_VA1: 20/20-1
OS_ADD: +2.50
OD_SPHERE: +0.25
OS_VA3: 20/
OS_VA2: 20/
OU_VA: 20/
OS_VA2: 20/20-1
OS_VA3: 20/
OS_CYLINDER: -0.25
OS_VA1: 20/
OD_VA1: 20/
OD_VA2: 20/20-1
OU_VA: 20/
OD_ADD: +2.50
OD_VA3: 20/
OS_AXIS: 090
OD_CYLINDER: -0.25
OD_AXIS: 115
OD_VA2: 20/
OD_VA3: 20/
OS_VA1: 20/20-1
OS_SPHERE: +0.75

## 2019-08-01 ASSESSMENT — REFRACTION_CURRENTRX
OS_CYLINDER: -0.25
OD_OVR_VA: 20/
OD_VPRISM_DIRECTION: BF
OS_SPHERE: +0.50
OS_AXIS: 078
OD_OVR_VA: 20/
OD_ADD: +2.75
OS_OVR_VA: 20/
OS_ADD: +2.75
OD_AXIS: 117
OS_OVR_VA: 20/
OS_OVR_VA: 20/
OD_OVR_VA: 20/
OS_VPRISM_DIRECTION: BF
OD_SPHERE: +0.25
OD_CYLINDER: -0.25

## 2019-08-01 ASSESSMENT — LID EXAM ASSESSMENTS
OD_COMMENTS: 1+ MGD
OD_BLEPHARITIS: RLL RUL 1+
OS_BLEPHARITIS: LLL LUL 1+
OS_COMMENTS: 1+ MGD

## 2019-08-01 ASSESSMENT — CONFRONTATIONAL VISUAL FIELD TEST (CVF)
OS_FINDINGS: FULL
OD_FINDINGS: FULL

## 2019-08-01 ASSESSMENT — SPHEQUIV_DERIVED
OS_SPHEQUIV: 0.75
OD_SPHEQUIV: 0.5
OD_SPHEQUIV: 0.125
OS_SPHEQUIV: 0.625

## 2019-08-01 ASSESSMENT — SUPERFICIAL PUNCTATE KERATITIS (SPK)
OS_SPK: ABSENT
OD_SPK: ABSENT

## 2019-08-01 ASSESSMENT — REFRACTION_AUTOREFRACTION
OS_CYLINDER: 0.00
OD_CYLINDER: 0.00
OD_AXIS: 180
OD_SPHERE: +0.50
OS_SPHERE: +0.75
OS_AXIS: 180

## 2019-08-01 ASSESSMENT — VISUAL ACUITY
OD_BCVA: 20/20-2
OS_BCVA: 20/20-1

## 2019-09-16 ENCOUNTER — DOCTOR'S OFFICE (OUTPATIENT)
Dept: URBAN - NONMETROPOLITAN AREA CLINIC 1 | Facility: CLINIC | Age: 59
Setting detail: OPHTHALMOLOGY
End: 2019-09-16
Payer: COMMERCIAL

## 2019-09-16 DIAGNOSIS — H21.233: ICD-10-CM

## 2019-09-16 DIAGNOSIS — H25.13: ICD-10-CM

## 2019-09-16 DIAGNOSIS — H04.123: ICD-10-CM

## 2019-09-16 DIAGNOSIS — H43.812: ICD-10-CM

## 2019-09-16 DIAGNOSIS — H04.122: ICD-10-CM

## 2019-09-16 DIAGNOSIS — H43.813: ICD-10-CM

## 2019-09-16 DIAGNOSIS — H43.811: ICD-10-CM

## 2019-09-16 PROCEDURE — 83861 MICROFLUID ANALY TEARS: CPT | Performed by: OPHTHALMOLOGY

## 2019-09-16 PROCEDURE — 92226 OPHTHALMOSCOPY EXT SUBSEQUENT: CPT | Performed by: OPHTHALMOLOGY

## 2019-09-16 PROCEDURE — 92134 CPTRZ OPH DX IMG PST SGM RTA: CPT | Performed by: OPHTHALMOLOGY

## 2019-09-16 PROCEDURE — 92014 COMPRE OPH EXAM EST PT 1/>: CPT | Performed by: OPHTHALMOLOGY

## 2019-09-16 ASSESSMENT — REFRACTION_CURRENTRX
OD_OVR_VA: 20/
OS_AXIS: 078
OD_SPHERE: +0.25
OS_OVR_VA: 20/
OD_ADD: +2.75
OD_OVR_VA: 20/
OD_OVR_VA: 20/
OS_OVR_VA: 20/
OD_AXIS: 117
OS_CYLINDER: -0.25
OS_ADD: +2.75
OS_SPHERE: +0.50
OS_VPRISM_DIRECTION: BF
OD_VPRISM_DIRECTION: BF
OD_CYLINDER: -0.25
OS_OVR_VA: 20/

## 2019-09-16 ASSESSMENT — SUPERFICIAL PUNCTATE KERATITIS (SPK)
OD_SPK: ABSENT
OS_SPK: ABSENT

## 2019-09-16 ASSESSMENT — CONFRONTATIONAL VISUAL FIELD TEST (CVF)
OS_FINDINGS: FULL
OD_FINDINGS: FULL

## 2019-09-16 ASSESSMENT — SPHEQUIV_DERIVED
OD_SPHEQUIV: 0.125
OS_SPHEQUIV: 0.75
OS_SPHEQUIV: 0.625
OD_SPHEQUIV: 0.5

## 2019-09-16 ASSESSMENT — REFRACTION_MANIFEST
OS_VA3: 20/
OD_ADD: +2.50
OS_ADD: +2.50
OD_CYLINDER: -0.25
OD_VA2: 20/
OD_AXIS: 115
OU_VA: 20/
OS_SPHERE: +0.75
OS_VA2: 20/20-1
OS_VA1: 20/20-1
OS_VA2: 20/
OD_VA3: 20/
OD_VA3: 20/
OU_VA: 20/
OS_CYLINDER: -0.25
OD_SPHERE: +0.25
OD_VA1: 20/20-1
OS_AXIS: 090
OS_VA1: 20/
OD_VA1: 20/
OS_VA3: 20/
OD_VA2: 20/20-1

## 2019-09-16 ASSESSMENT — REFRACTION_AUTOREFRACTION
OS_CYLINDER: 0.00
OD_SPHERE: +0.50
OD_AXIS: 180
OS_AXIS: 180
OD_CYLINDER: 0.00
OS_SPHERE: +0.75

## 2019-09-16 ASSESSMENT — LID EXAM ASSESSMENTS
OS_COMMENTS: 1+ MGD
OD_COMMENTS: 1+ MGD
OD_BLEPHARITIS: RLL RUL 1+
OS_BLEPHARITIS: LLL LUL 1+

## 2019-09-16 ASSESSMENT — DECREASING TEAR LAKE - SEVERITY SCORE
OS_DEC_TEARLAKE: 2+
OD_DEC_TEARLAKE: 1+

## 2019-09-16 ASSESSMENT — VISUAL ACUITY
OD_BCVA: 20/20-2
OS_BCVA: 20/20-2

## 2019-09-26 ENCOUNTER — DOCTOR'S OFFICE (OUTPATIENT)
Dept: URBAN - NONMETROPOLITAN AREA CLINIC 1 | Facility: CLINIC | Age: 59
Setting detail: OPHTHALMOLOGY
End: 2019-09-26
Payer: COMMERCIAL

## 2019-09-26 DIAGNOSIS — H04.122: ICD-10-CM

## 2019-09-26 DIAGNOSIS — H04.123: ICD-10-CM

## 2019-09-26 PROCEDURE — 68761 CLOSE TEAR DUCT OPENING: CPT | Performed by: OPHTHALMOLOGY

## 2019-09-26 ASSESSMENT — SPHEQUIV_DERIVED
OD_SPHEQUIV: 0.5
OS_SPHEQUIV: 0.75
OS_SPHEQUIV: 0.625
OD_SPHEQUIV: 0.125

## 2019-09-26 ASSESSMENT — REFRACTION_AUTOREFRACTION
OD_SPHERE: +0.50
OS_AXIS: 180
OD_AXIS: 180
OD_CYLINDER: 0.00
OS_SPHERE: +0.75
OS_CYLINDER: 0.00

## 2019-09-26 ASSESSMENT — REFRACTION_CURRENTRX
OS_CYLINDER: -0.25
OS_OVR_VA: 20/
OD_OVR_VA: 20/
OD_AXIS: 117
OS_VPRISM_DIRECTION: BF
OS_ADD: +2.75
OD_VPRISM_DIRECTION: BF
OD_ADD: +2.75
OD_OVR_VA: 20/
OS_OVR_VA: 20/
OD_CYLINDER: -0.25
OS_SPHERE: +0.50
OS_OVR_VA: 20/
OD_OVR_VA: 20/
OS_AXIS: 078
OD_SPHERE: +0.25

## 2019-09-26 ASSESSMENT — REFRACTION_MANIFEST
OS_VA1: 20/
OD_VA2: 20/
OU_VA: 20/
OS_VA1: 20/20-1
OS_VA2: 20/
OD_VA1: 20/20-1
OS_AXIS: 090
OD_VA2: 20/20-1
OS_CYLINDER: -0.25
OD_VA3: 20/
OD_SPHERE: +0.25
OU_VA: 20/
OD_CYLINDER: -0.25
OD_VA1: 20/
OS_VA3: 20/
OS_SPHERE: +0.75
OD_VA3: 20/
OD_ADD: +2.50
OS_ADD: +2.50
OD_AXIS: 115
OS_VA2: 20/20-1
OS_VA3: 20/

## 2019-09-26 ASSESSMENT — VISUAL ACUITY
OS_BCVA: 20/20-2
OD_BCVA: 20/20-2

## 2019-12-10 ENCOUNTER — DOCTOR'S OFFICE (OUTPATIENT)
Dept: URBAN - NONMETROPOLITAN AREA CLINIC 1 | Facility: CLINIC | Age: 59
Setting detail: OPHTHALMOLOGY
End: 2019-12-10
Payer: COMMERCIAL

## 2019-12-10 DIAGNOSIS — H25.013: ICD-10-CM

## 2019-12-10 DIAGNOSIS — H40.013: ICD-10-CM

## 2019-12-10 DIAGNOSIS — H01.002: ICD-10-CM

## 2019-12-10 DIAGNOSIS — H01.001: ICD-10-CM

## 2019-12-10 DIAGNOSIS — H04.123: ICD-10-CM

## 2019-12-10 PROCEDURE — 68761 CLOSE TEAR DUCT OPENING: CPT | Performed by: OPHTHALMOLOGY

## 2019-12-10 PROCEDURE — 92014 COMPRE OPH EXAM EST PT 1/>: CPT | Performed by: OPHTHALMOLOGY

## 2019-12-10 PROCEDURE — 92250 FUNDUS PHOTOGRAPHY W/I&R: CPT | Performed by: OPHTHALMOLOGY

## 2019-12-10 PROCEDURE — 92083 EXTENDED VISUAL FIELD XM: CPT | Performed by: OPHTHALMOLOGY

## 2019-12-10 ASSESSMENT — REFRACTION_MANIFEST
OD_CYLINDER: -0.25
OS_AXIS: 090
OD_ADD: +2.50
OD_VA2: 20/
OD_VA2: 20/20-1
OS_SPHERE: +0.75
OD_VA3: 20/
OD_SPHERE: +0.25
OD_AXIS: 115
OS_CYLINDER: -0.25
OU_VA: 20/
OU_VA: 20/
OS_VA3: 20/
OS_VA1: 20/20-1
OS_ADD: +2.50
OS_VA1: 20/
OD_VA1: 20/20-1
OD_VA1: 20/
OS_VA2: 20/20-1
OS_VA3: 20/
OS_VA2: 20/
OD_VA3: 20/

## 2019-12-10 ASSESSMENT — DRY EYES - PHYSICIAN NOTES
OS_GENERALCOMMENTS: KISICCA
OD_GENERALCOMMENTS: KISICCA

## 2019-12-10 ASSESSMENT — REFRACTION_CURRENTRX
OD_OVR_VA: 20/
OS_OVR_VA: 20/
OS_OVR_VA: 20/
OD_OVR_VA: 20/
OD_OVR_VA: 20/
OS_AXIS: 078
OD_ADD: +2.75
OS_OVR_VA: 20/
OD_VPRISM_DIRECTION: BF
OS_VPRISM_DIRECTION: BF
OS_CYLINDER: -0.25
OD_AXIS: 117
OS_ADD: +2.75
OD_CYLINDER: -0.25
OS_SPHERE: +0.50
OD_SPHERE: +0.25

## 2019-12-10 ASSESSMENT — CONFRONTATIONAL VISUAL FIELD TEST (CVF)
OS_FINDINGS: FULL
OD_FINDINGS: FULL

## 2019-12-10 ASSESSMENT — SUPERFICIAL PUNCTATE KERATITIS (SPK)
OS_SPK: 1+
OD_SPK: 1+

## 2019-12-10 ASSESSMENT — REFRACTION_AUTOREFRACTION
OD_AXIS: 180
OS_SPHERE: +1.00
OS_CYLINDER: -0.25
OD_CYLINDER: 0.00
OS_AXIS: 64
OD_SPHERE: +0.50

## 2019-12-10 ASSESSMENT — LID EXAM ASSESSMENTS
OS_BLEPHARITIS: LLL LUL T
OD_COMMENTS: 1+ MGD
OS_COMMENTS: 1+ MGD
OD_BLEPHARITIS: RLL RUL T

## 2019-12-10 ASSESSMENT — VISUAL ACUITY
OD_BCVA: 20/25-2
OS_BCVA: 20/25+2

## 2019-12-10 ASSESSMENT — DECREASING TEAR LAKE - SEVERITY SCORE
OS_DEC_TEARLAKE: 1+
OD_DEC_TEARLAKE: 1+

## 2019-12-10 ASSESSMENT — SPHEQUIV_DERIVED
OD_SPHEQUIV: 0.5
OS_SPHEQUIV: 0.625
OD_SPHEQUIV: 0.125
OS_SPHEQUIV: 0.875

## 2020-01-07 ENCOUNTER — DOCTOR'S OFFICE (OUTPATIENT)
Dept: URBAN - NONMETROPOLITAN AREA CLINIC 1 | Facility: CLINIC | Age: 60
Setting detail: OPHTHALMOLOGY
End: 2020-01-07
Payer: COMMERCIAL

## 2020-01-07 VITALS — HEIGHT: 60 IN

## 2020-01-07 DIAGNOSIS — H01.001: ICD-10-CM

## 2020-01-07 DIAGNOSIS — E11.9: ICD-10-CM

## 2020-01-07 DIAGNOSIS — H04.121: ICD-10-CM

## 2020-01-07 DIAGNOSIS — H01.002: ICD-10-CM

## 2020-01-07 DIAGNOSIS — H04.122: ICD-10-CM

## 2020-01-07 DIAGNOSIS — H04.123: ICD-10-CM

## 2020-01-07 PROCEDURE — 83861 MICROFLUID ANALY TEARS: CPT | Performed by: OPHTHALMOLOGY

## 2020-01-07 PROCEDURE — 92014 COMPRE OPH EXAM EST PT 1/>: CPT | Performed by: OPHTHALMOLOGY

## 2020-01-07 ASSESSMENT — SPHEQUIV_DERIVED
OS_SPHEQUIV: 0.875
OD_SPHEQUIV: 0.125
OS_SPHEQUIV: 0.625
OD_SPHEQUIV: 0.5

## 2020-01-07 ASSESSMENT — LID EXAM ASSESSMENTS
OD_COMMENTS: 1+ MGD
OD_BLEPHARITIS: RLL RUL T
OS_BLEPHARITIS: LLL LUL T
OS_COMMENTS: 1+ MGD

## 2020-01-07 ASSESSMENT — REFRACTION_CURRENTRX
OD_CYLINDER: -0.25
OD_OVR_VA: 20/
OD_AXIS: 117
OS_CYLINDER: -0.25
OD_VPRISM_DIRECTION: BF
OD_SPHERE: +0.25
OS_AXIS: 078
OS_OVR_VA: 20/
OS_VPRISM_DIRECTION: BF
OS_SPHERE: +0.50
OD_ADD: +2.75
OS_ADD: +2.75

## 2020-01-07 ASSESSMENT — SUPERFICIAL PUNCTATE KERATITIS (SPK)
OD_SPK: 1+
OS_SPK: 1+

## 2020-01-07 ASSESSMENT — DECREASING TEAR LAKE - SEVERITY SCORE
OS_DEC_TEARLAKE: 1+
OD_DEC_TEARLAKE: 1+

## 2020-01-07 ASSESSMENT — REFRACTION_MANIFEST
OS_CYLINDER: -0.25
OD_AXIS: 115
OU_VA: 20/
OS_VA3: 20/
OS_VA1: 20/20-1
OD_VA3: 20/
OD_VA2: 20/20-1
OD_SPHERE: +0.25
OS_VA2: 20/20-1
OD_CYLINDER: -0.25
OS_SPHERE: +0.75
OS_AXIS: 090
OS_ADD: +2.50
OD_ADD: +2.50
OD_VA1: 20/20-1

## 2020-01-07 ASSESSMENT — PUNCTA - ASSESSMENT
OD_PUNCTA: RLL MED
OS_PUNCTA: LLL MED

## 2020-01-07 ASSESSMENT — DRY EYES - PHYSICIAN NOTES
OS_GENERALCOMMENTS: KISICCA
OD_GENERALCOMMENTS: KISICCA

## 2020-01-07 ASSESSMENT — REFRACTION_AUTOREFRACTION
OD_AXIS: 180
OS_AXIS: 64
OS_SPHERE: +1.00
OD_CYLINDER: 0.00
OD_SPHERE: +0.50
OS_CYLINDER: -0.25

## 2020-01-07 ASSESSMENT — CONFRONTATIONAL VISUAL FIELD TEST (CVF)
OS_FINDINGS: FULL
OD_FINDINGS: FULL

## 2020-01-07 ASSESSMENT — VISUAL ACUITY
OS_BCVA: 20/20-1
OD_BCVA: 20/20-1

## 2020-03-07 ENCOUNTER — HOSPITAL ENCOUNTER (EMERGENCY)
Facility: HOSPITAL | Age: 60
Discharge: HOME | End: 2020-03-07
Admitting: EMERGENCY MEDICINE
Payer: COMMERCIAL

## 2020-03-07 VITALS
SYSTOLIC BLOOD PRESSURE: 109 MMHG | WEIGHT: 184.97 LBS | RESPIRATION RATE: 20 BRPM | DIASTOLIC BLOOD PRESSURE: 54 MMHG | TEMPERATURE: 98 F | HEART RATE: 94 BPM | OXYGEN SATURATION: 98 % | BODY MASS INDEX: 28.03 KG/M2 | HEIGHT: 68 IN

## 2020-03-07 DIAGNOSIS — E83.42 HYPOMAGNESEMIA: ICD-10-CM

## 2020-03-07 DIAGNOSIS — R25.2 MUSCLE CRAMPS: Primary | ICD-10-CM

## 2020-03-07 LAB
ANION GAP SERPL CALCULATED.3IONS-SCNC: 9 MMOL/L (ref 4–13)
BASOPHILS # BLD MANUAL: 0 THOUSAND/UL (ref 0–0.1)
BASOPHILS NFR MAR MANUAL: 0 % (ref 0–1)
BUN SERPL-MCNC: 14 MG/DL (ref 5–25)
CALCIUM SERPL-MCNC: 9.1 MG/DL (ref 8.3–10.1)
CHLORIDE SERPL-SCNC: 95 MMOL/L (ref 100–108)
CO2 SERPL-SCNC: 26 MMOL/L (ref 21–32)
CREAT SERPL-MCNC: 1.05 MG/DL (ref 0.6–1.3)
EOSINOPHIL # BLD MANUAL: 0.11 THOUSAND/UL (ref 0–0.4)
EOSINOPHIL NFR BLD MANUAL: 1 % (ref 0–6)
ERYTHROCYTE [DISTWIDTH] IN BLOOD BY AUTOMATED COUNT: 14.8 % (ref 11.6–15.1)
GFR SERPL CREATININE-BSD FRML MDRD: 77 ML/MIN/1.73SQ M
GLUCOSE SERPL-MCNC: 151 MG/DL (ref 65–140)
HCT VFR BLD AUTO: 30.9 % (ref 36.5–49.3)
HGB BLD-MCNC: 10.3 G/DL (ref 12–17)
HYPERCHROMIA BLD QL SMEAR: PRESENT
LYMPHOCYTES # BLD AUTO: 1.89 THOUSAND/UL (ref 0.6–4.47)
LYMPHOCYTES # BLD AUTO: 18 % (ref 14–44)
MAGNESIUM SERPL-MCNC: 1.3 MG/DL (ref 1.6–2.6)
MCH RBC QN AUTO: 28.5 PG (ref 26.8–34.3)
MCHC RBC AUTO-ENTMCNC: 33.3 G/DL (ref 31.4–37.4)
MCV RBC AUTO: 86 FL (ref 82–98)
MONOCYTES # BLD AUTO: 1.68 THOUSAND/UL (ref 0–1.22)
MONOCYTES NFR BLD: 16 % (ref 4–12)
NEUTROPHILS # BLD MANUAL: 6.84 THOUSAND/UL (ref 1.85–7.62)
NEUTS BAND NFR BLD MANUAL: 5 % (ref 0–8)
NEUTS SEG NFR BLD AUTO: 60 % (ref 43–75)
NRBC BLD AUTO-RTO: 0 /100 WBCS
PLATELET # BLD AUTO: 325 THOUSANDS/UL (ref 149–390)
PLATELET BLD QL SMEAR: ADEQUATE
PMV BLD AUTO: 9.8 FL (ref 8.9–12.7)
POTASSIUM SERPL-SCNC: 4.2 MMOL/L (ref 3.5–5.3)
RBC # BLD AUTO: 3.61 MILLION/UL (ref 3.88–5.62)
SODIUM SERPL-SCNC: 130 MMOL/L (ref 136–145)
TOTAL CELLS COUNTED SPEC: 100
WBC # BLD AUTO: 10.52 THOUSAND/UL (ref 4.31–10.16)

## 2020-03-07 PROCEDURE — 96365 THER/PROPH/DIAG IV INF INIT: CPT

## 2020-03-07 PROCEDURE — 83735 ASSAY OF MAGNESIUM: CPT | Performed by: PHYSICIAN ASSISTANT

## 2020-03-07 PROCEDURE — 85007 BL SMEAR W/DIFF WBC COUNT: CPT | Performed by: PHYSICIAN ASSISTANT

## 2020-03-07 PROCEDURE — 36415 COLL VENOUS BLD VENIPUNCTURE: CPT | Performed by: PHYSICIAN ASSISTANT

## 2020-03-07 PROCEDURE — 80048 BASIC METABOLIC PNL TOTAL CA: CPT | Performed by: PHYSICIAN ASSISTANT

## 2020-03-07 PROCEDURE — 99284 EMERGENCY DEPT VISIT MOD MDM: CPT | Performed by: PHYSICIAN ASSISTANT

## 2020-03-07 PROCEDURE — 99283 EMERGENCY DEPT VISIT LOW MDM: CPT

## 2020-03-07 PROCEDURE — 85027 COMPLETE CBC AUTOMATED: CPT | Performed by: PHYSICIAN ASSISTANT

## 2020-03-07 RX ORDER — LEVOTHYROXINE SODIUM 0.05 MG/1
TABLET ORAL
COMMUNITY
Start: 2018-04-19

## 2020-03-07 RX ORDER — MAGNESIUM SULFATE 1 G/100ML
1 INJECTION INTRAVENOUS ONCE
Status: COMPLETED | OUTPATIENT
Start: 2020-03-07 | End: 2020-03-07

## 2020-03-07 RX ORDER — MAGNESIUM L-LACTATE 84 MG
84 TABLET, EXTENDED RELEASE ORAL DAILY
Qty: 5 TABLET | Refills: 0 | Status: SHIPPED | OUTPATIENT
Start: 2020-03-07 | End: 2020-03-12

## 2020-03-07 RX ORDER — SIMVASTATIN 40 MG
TABLET ORAL
COMMUNITY
Start: 2016-02-01 | End: 2021-11-09 | Stop reason: HOSPADM

## 2020-03-07 RX ORDER — DEXLANSOPRAZOLE 60 MG/1
CAPSULE, DELAYED RELEASE ORAL
COMMUNITY
Start: 2015-12-15

## 2020-03-07 RX ORDER — VENLAFAXINE HYDROCHLORIDE 150 MG/1
CAPSULE, EXTENDED RELEASE ORAL
COMMUNITY

## 2020-03-07 RX ORDER — LORATADINE 10 MG/1
TABLET ORAL
COMMUNITY
Start: 2015-09-01

## 2020-03-07 RX ORDER — MECLIZINE HYDROCHLORIDE 25 MG/1
TABLET ORAL
COMMUNITY

## 2020-03-07 RX ORDER — TAMSULOSIN HYDROCHLORIDE 0.4 MG/1
CAPSULE ORAL
COMMUNITY

## 2020-03-07 RX ORDER — LISINOPRIL 20 MG/1
20 TABLET ORAL DAILY
COMMUNITY

## 2020-03-07 RX ORDER — OXYCODONE HYDROCHLORIDE 15 MG/1
15 TABLET ORAL EVERY 8 HOURS PRN
COMMUNITY
End: 2021-11-09 | Stop reason: HOSPADM

## 2020-03-07 RX ORDER — FUROSEMIDE 20 MG/1
TABLET ORAL
COMMUNITY

## 2020-03-07 RX ADMIN — SODIUM CHLORIDE 500 ML: 0.9 INJECTION, SOLUTION INTRAVENOUS at 19:53

## 2020-03-07 RX ADMIN — MAGNESIUM SULFATE HEPTAHYDRATE 1 G: 1 INJECTION, SOLUTION INTRAVENOUS at 19:54

## 2020-03-08 NOTE — ED PROVIDER NOTES
History  Chief Complaint   Patient presents with    Leg Pain     since last night bilateral leg pain from bottom of feet to hips, just discharged from [facility] for abdominal surgery, is to return next week for continuation of surgery     The patient is a 60-year-old male with a past medical history of chronic pancreatitis who presents emergency department today with a chief complaint of bilateral lower leg pain  Patient states that he is having muscle cramps that have been worse since yesterday  Patient states that he has noticed some in both lower extremities  Patient denies any falls or traumas  Patient states that 4 days ago the patient was admitted to the hospital for an inpatient procedure were GI did an EGD to drain some rosemary pancreatic fluid  Patient had a 2 night hospital stay and was discharged on March 5th which was approximately 2 days ago  While in patient patient was given shots of Lovenox for DVT prophylaxis  Patient states that he 1st noticed the pain yesterday while moving around  Patient states that the pain is worse with movement and better with rest   Patient denies any swelling, erythema, warmth, fevers or chills  Leg Pain   Location:  Leg  Time since incident:  2 days  Injury: no    Leg location:  L leg and R leg  Pain details:     Quality:  Cramping    Radiates to:  Does not radiate    Severity:  Mild    Onset quality:  Unable to specify    Duration:  2 days    Timing:  Constant    Progression:  Worsening  Chronicity:  New  Dislocation: no    Foreign body present:  No foreign bodies  Tetanus status:  Up to date  Prior injury to area:  No  Relieved by:  Rest  Worsened by:   Activity  Ineffective treatments:  Rest  Associated symptoms: no back pain, no decreased ROM, no fatigue, no fever, no itching, no muscle weakness, no neck pain, no numbness, no stiffness, no swelling and no tingling        Prior to Admission Medications   Prescriptions Last Dose Informant Patient Reported? Taking?   dexlansoprazole (Dexilant) 60 MG capsule   Yes Yes   Sig: Take 1 Cap by mouth daily  furosemide (Lasix) 20 mg tablet   Yes No   Sig: Take by mouth every 3 (three) days   levothyroxine 50 mcg tablet   Yes Yes   lisinopril (ZESTRIL) 20 mg tablet   Yes No   Sig: Take 20 mg by mouth daily   loratadine (CLARITIN) 10 mg tablet   Yes Yes   Sig: Take by mouth   meclizine (ANTIVERT) 25 mg tablet   Yes No   Sig: Take by mouth   metFORMIN (GLUCOPHAGE) 1000 MG tablet   Yes No   Sig: Take by mouth daily   oxyCODONE (ROXICODONE) 15 mg immediate release tablet   Yes No   Sig: Take 15 mg by mouth every 8 (eight) hours as needed   simvastatin (ZOCOR) 40 mg tablet   Yes Yes   Sig: Take 1 Tab by mouth daily  tamsulosin (Flomax) 0 4 mg   Yes No   Sig: Take by mouth   venlafaxine (Effexor XR) 150 mg 24 hr capsule   Yes No   Sig: Take by mouth      Facility-Administered Medications: None       Past Medical History:   Diagnosis Date    Acute renal failure (ARF) (HCC)     BPH (benign prostatic hyperplasia)     [facility]     [facility]     Disease of thyroid gland     GERD (gastroesophageal reflux disease)     High cholesterol     Hypertension     IBS (irritable bowel syndrome)     Obstructive jaundice     Pancreatic necrosis        Past Surgical History:   Procedure Laterality Date    ABDOMINAL SURGERY         History reviewed  No pertinent family history  I have reviewed and agree with the history as documented  E-Cigarette/Vaping    E-Cigarette Use Never User      E-Cigarette/Vaping Substances     Social History     Tobacco Use    Smoking status: Never Smoker    Smokeless tobacco: Never Used   Substance Use Topics    Alcohol use: Never     Frequency: Never    Drug use: Never       Review of Systems   Constitutional: Negative for chills, fatigue and fever  HENT: Negative for ear pain  Eyes: Negative for pain and visual disturbance     Respiratory: Negative for shortness of breath and stridor  Cardiovascular: Negative for chest pain and palpitations  Gastrointestinal: Negative for abdominal pain, nausea and vomiting  Genitourinary: Negative for dysuria and hematuria  Musculoskeletal: Negative for arthralgias, back pain, neck pain and stiffness  Skin: Negative for color change, itching and rash  Neurological: Negative for seizures and speech difficulty  Physical Exam  Physical Exam   Constitutional: He is oriented to person, place, and time  He appears well-developed and well-nourished  No distress  HENT:   Head: Normocephalic and atraumatic  Mouth/Throat: Oropharynx is clear and moist    Eyes: Pupils are equal, round, and reactive to light  EOM are normal    Neck: Normal range of motion  Cardiovascular: Normal rate and regular rhythm  No murmur heard  Pulmonary/Chest: Effort normal and breath sounds normal  No respiratory distress  Abdominal: Soft  Bowel sounds are normal  There is no tenderness  Musculoskeletal: He exhibits no edema or deformity  Right hip: Normal         Left hip: Normal         Right knee: Normal         Left knee: Normal         Right ankle: Normal         Left ankle: Normal         Lumbar back: Normal    Patient having diffuse muscle tenderness over bilateral upper thighs, and lower legs  Neurological: He is alert and oriented to person, place, and time  He has normal strength  Gait normal  GCS eye subscore is 4  GCS verbal subscore is 5  GCS motor subscore is 6  Skin: Skin is warm and dry  Capillary refill takes less than 2 seconds  Psychiatric: He has a normal mood and affect  His behavior is normal    Nursing note and vitals reviewed        Vital Signs  ED Triage Vitals [03/07/20 1837]   Temp Pulse Respirations Blood Pressure SpO2   -- 96 18 99/52 100 %      Temp src Heart Rate Source Patient Position - Orthostatic VS BP Location FiO2 (%)   -- Monitor Sitting Left arm --      Pain Score       7 Vitals:    03/07/20 1837 03/07/20 1845   BP: 99/52 99/52   Pulse: 96    Patient Position - Orthostatic VS: Sitting          Visual Acuity      ED Medications  Medications   sodium chloride 0 9 % bolus 500 mL (500 mL Intravenous New Bag 3/7/20 1953)   magnesium sulfate IVPB (premix) SOLN 1 g (has no administration in time range)       Diagnostic Studies  Results Reviewed     Procedure Component Value Units Date/Time    CBC and differential [[id number]]  (Abnormal) Collected:  03/07/20 1854    Lab Status:  Final result Specimen:  Blood from Arm, Right Updated:  03/07/20 1925     WBC 10 52 Thousand/uL      RBC 3 61 Million/uL      Hemoglobin 10 3 g/dL      Hematocrit 30 9 %      MCV 86 fL      MCH 28 5 pg      MCHC 33 3 g/dL      RDW 14 8 %      MPV 9 8 fL      Platelets 628 Thousands/uL      nRBC 0 /100 WBCs     Narrative: This is an appended report  These results have been appended to a previously verified report      Basic metabolic panel [[id number]]  (Abnormal) Collected:  03/07/20 1852    Lab Status:  Final result Specimen:  Blood from Arm, Right Updated:  03/07/20 1912     Sodium 130 mmol/L      Potassium 4 2 mmol/L      Chloride 95 mmol/L      CO2 26 mmol/L      ANION GAP 9 mmol/L      BUN 14 mg/dL      Creatinine 1 05 mg/dL      Glucose 151 mg/dL      Calcium 9 1 mg/dL      eGFR 77 ml/min/1 73sq m     Narrative:       [place] guidelines for Chronic Kidney Disease (CKD):     Stage 1 with normal or high GFR (GFR > 90 mL/min/1 73 square meters)    Stage 2 Mild CKD (GFR = 60-89 mL/min/1 73 square meters)    Stage 3A Moderate CKD (GFR = 45-59 mL/min/1 73 square meters)    Stage 3B Moderate CKD (GFR = 30-44 mL/min/1 73 square meters)    Stage 4 Severe CKD (GFR = 15-29 mL/min/1 73 square meters)    Stage 5 End Stage CKD (GFR <15 mL/min/1 73 square meters)  Note: GFR calculation is accurate only with a steady state creatinine    Magnesium [[id number]]  (Abnormal) Collected: 03/07/20 1852    Lab Status:  Final result Specimen:  Blood from Arm, Right Updated:  03/07/20 1911     Magnesium 1 3 mg/dL                  No orders to display              Procedures  Procedures         ED Course                               MDM  Number of Diagnoses or Management Options  Hypomagnesemia:   Muscle cramps:   Diagnosis management comments: Differential diagnosis included but was not limited to electrolyte abnormality, so spasms  Per care everywhere:  Patient's laboratory findings of his leukocytosis of 10,000 is similar to previous laboratory findings  Patient had lab work drawn on 03/05 which illustrated a leukocytosis of 11,000  Patient typically runs low on sodium around 134-136 however today was found to be lower at 130  Magnesium was found to be 1 6 so was replenished in the emergency department and given a prescription for magnesium as an outpatient  This is probable cause for his muscle cramping  Patient was educated on the findings of today's lab work and instructed follow up with his primary care doctor as an outpatient  Amount and/or Complexity of Data Reviewed  Clinical lab tests: ordered and reviewed  Review and summarize past medical records: yes          Disposition  Final diagnoses:   Muscle cramps   Hypomagnesemia     Time reflects when diagnosis was documented in both MDM as applicable and the Disposition within this note     Time User Action Codes Description Comment    3/7/2020  7:23 PM Olga Magallanes [R25 2] Muscle cramps     3/7/2020  7:24 PM Olga Magallanes [E83 42] Hypomagnesemia       ED Disposition     ED Disposition Condition Date/Time Comment    Discharge Stable Sat Mar 7, 2020  7:23 PM Dorothy Arriaga discharge to home/self care              Follow-up Information     Follow up With Specialties Details Why Harshal Ken DO Internal Medicine Schedule an appointment as soon as possible for a visit in 2 days  [facility] [address]  [phone]            Patient's Medications   Discharge Prescriptions    MAGNESIUM (MAGTAB) 84 MG (7MEQ) TBCR    Take 1 tablet (84 mg total) by mouth daily for 5 days       Start Date: 3/7/2020  End Date: 3/12/2020       Order Dose: 84 mg       Quantity: 5 tablet    Refills: 0     No discharge procedures on file      PDMP Review     None          ED Provider  Electronically Signed by           Arnoldo Murry PA-C  03/07/20 1954

## 2020-03-09 ENCOUNTER — HOSPITAL ENCOUNTER (EMERGENCY)
Facility: HOSPITAL | Age: 60
Discharge: HOME | End: 2020-03-09
Attending: EMERGENCY MEDICINE | Admitting: EMERGENCY MEDICINE
Payer: COMMERCIAL

## 2020-03-09 ENCOUNTER — APPOINTMENT (EMERGENCY)
Dept: RADIOLOGY | Facility: HOSPITAL | Age: 60
End: 2020-03-09
Payer: COMMERCIAL

## 2020-03-09 ENCOUNTER — APPOINTMENT (EMERGENCY)
Dept: NON INVASIVE DIAGNOSTICS | Facility: HOSPITAL | Age: 60
End: 2020-03-09
Payer: COMMERCIAL

## 2020-03-09 ENCOUNTER — APPOINTMENT (EMERGENCY)
Dept: CT IMAGING | Facility: HOSPITAL | Age: 60
End: 2020-03-09
Payer: COMMERCIAL

## 2020-03-09 VITALS
DIASTOLIC BLOOD PRESSURE: 70 MMHG | TEMPERATURE: 98.1 F | OXYGEN SATURATION: 99 % | HEART RATE: 80 BPM | WEIGHT: 184.97 LBS | BODY MASS INDEX: 28.12 KG/M2 | RESPIRATION RATE: 18 BRPM | SYSTOLIC BLOOD PRESSURE: 110 MMHG

## 2020-03-09 DIAGNOSIS — R79.89 ELEVATED D-DIMER: ICD-10-CM

## 2020-03-09 DIAGNOSIS — L03.114 LEFT ARM CELLULITIS: Primary | ICD-10-CM

## 2020-03-09 DIAGNOSIS — M79.604 BILATERAL LEG PAIN: ICD-10-CM

## 2020-03-09 DIAGNOSIS — M79.89 LEG SWELLING: ICD-10-CM

## 2020-03-09 DIAGNOSIS — M79.89 ARM SWELLING: ICD-10-CM

## 2020-03-09 DIAGNOSIS — M79.605 BILATERAL LEG PAIN: ICD-10-CM

## 2020-03-09 DIAGNOSIS — M79.602 PAIN, ARM, LEFT: ICD-10-CM

## 2020-03-09 LAB
ALBUMIN SERPL BCP-MCNC: 2.1 G/DL (ref 3.5–5)
ALP SERPL-CCNC: 114 U/L (ref 46–116)
ALT SERPL W P-5'-P-CCNC: 25 U/L (ref 12–78)
ANION GAP SERPL CALCULATED.3IONS-SCNC: 6 MMOL/L (ref 4–13)
ANISOCYTOSIS BLD QL SMEAR: PRESENT
AST SERPL W P-5'-P-CCNC: 11 U/L (ref 5–45)
BASOPHILS # BLD MANUAL: 0 THOUSAND/UL (ref 0–0.1)
BASOPHILS NFR MAR MANUAL: 0 % (ref 0–1)
BILIRUB SERPL-MCNC: 0.76 MG/DL (ref 0.2–1)
BILIRUB UR QL STRIP: ABNORMAL
BUN SERPL-MCNC: 13 MG/DL (ref 5–25)
CALCIUM SERPL-MCNC: 8.3 MG/DL (ref 8.3–10.1)
CHLORIDE SERPL-SCNC: 97 MMOL/L (ref 100–108)
CLARITY UR: CLEAR
CO2 SERPL-SCNC: 28 MMOL/L (ref 21–32)
COLOR UR: YELLOW
CREAT SERPL-MCNC: 0.98 MG/DL (ref 0.6–1.3)
D DIMER PPP FEU-MCNC: 1.15 UG/ML FEU
EOSINOPHIL # BLD MANUAL: 0 THOUSAND/UL (ref 0–0.4)
EOSINOPHIL NFR BLD MANUAL: 0 % (ref 0–6)
ERYTHROCYTE [DISTWIDTH] IN BLOOD BY AUTOMATED COUNT: 15 % (ref 11.6–15.1)
GFR SERPL CREATININE-BSD FRML MDRD: 83 ML/MIN/1.73SQ M
GLUCOSE SERPL-MCNC: 266 MG/DL (ref 65–140)
GLUCOSE UR STRIP-MCNC: NEGATIVE MG/DL
HCT VFR BLD AUTO: 28.2 % (ref 36.5–49.3)
HGB BLD-MCNC: 9.1 G/DL (ref 12–17)
HGB UR QL STRIP.AUTO: NEGATIVE
HYPERCHROMIA BLD QL SMEAR: PRESENT
INR PPP: 1.36 (ref 0.84–1.19)
KETONES UR STRIP-MCNC: NEGATIVE MG/DL
LEUKOCYTE ESTERASE UR QL STRIP: NEGATIVE
LIPASE SERPL-CCNC: 59 U/L (ref 73–393)
LYMPHOCYTES # BLD AUTO: 1.08 THOUSAND/UL (ref 0.6–4.47)
LYMPHOCYTES # BLD AUTO: 13 % (ref 14–44)
MCH RBC QN AUTO: 27.9 PG (ref 26.8–34.3)
MCHC RBC AUTO-ENTMCNC: 32.3 G/DL (ref 31.4–37.4)
MCV RBC AUTO: 87 FL (ref 82–98)
MONOCYTES # BLD AUTO: 1.33 THOUSAND/UL (ref 0–1.22)
MONOCYTES NFR BLD: 16 % (ref 4–12)
MYELOCYTES NFR BLD MANUAL: 2 % (ref 0–1)
NEUTROPHILS # BLD MANUAL: 5.73 THOUSAND/UL (ref 1.85–7.62)
NEUTS BAND NFR BLD MANUAL: 9 % (ref 0–8)
NEUTS SEG NFR BLD AUTO: 60 % (ref 43–75)
NITRITE UR QL STRIP: NEGATIVE
NRBC BLD AUTO-RTO: 0 /100 WBCS
PH UR STRIP.AUTO: 6.5 [PH]
PLATELET # BLD AUTO: 317 THOUSANDS/UL (ref 149–390)
PLATELET BLD QL SMEAR: ADEQUATE
PMV BLD AUTO: 10.2 FL (ref 8.9–12.7)
POLYCHROMASIA BLD QL SMEAR: PRESENT
POTASSIUM SERPL-SCNC: 4.1 MMOL/L (ref 3.5–5.3)
PROT SERPL-MCNC: 6.5 G/DL (ref 6.4–8.2)
PROT UR STRIP-MCNC: NEGATIVE MG/DL
PROTHROMBIN TIME: 16.8 SECONDS (ref 11.6–14.5)
RBC # BLD AUTO: 3.26 MILLION/UL (ref 3.88–5.62)
RBC MORPH BLD: PRESENT
SODIUM SERPL-SCNC: 131 MMOL/L (ref 136–145)
SP GR UR STRIP.AUTO: <=1.005 (ref 1–1.03)
TOTAL CELLS COUNTED SPEC: 100
TROPONIN I SERPL-MCNC: <0.02 NG/ML
UROBILINOGEN UR QL STRIP.AUTO: 4 E.U./DL
WBC # BLD AUTO: 8.3 THOUSAND/UL (ref 4.31–10.16)

## 2020-03-09 PROCEDURE — 99284 EMERGENCY DEPT VISIT MOD MDM: CPT

## 2020-03-09 PROCEDURE — 85610 PROTHROMBIN TIME: CPT | Performed by: EMERGENCY MEDICINE

## 2020-03-09 PROCEDURE — 85007 BL SMEAR W/DIFF WBC COUNT: CPT | Performed by: EMERGENCY MEDICINE

## 2020-03-09 PROCEDURE — 84484 ASSAY OF TROPONIN QUANT: CPT | Performed by: EMERGENCY MEDICINE

## 2020-03-09 PROCEDURE — 73130 X-RAY EXAM OF HAND: CPT

## 2020-03-09 PROCEDURE — 73030 X-RAY EXAM OF SHOULDER: CPT

## 2020-03-09 PROCEDURE — 93970 EXTREMITY STUDY: CPT

## 2020-03-09 PROCEDURE — 74177 CT ABD & PELVIS W/CONTRAST: CPT

## 2020-03-09 PROCEDURE — 81003 URINALYSIS AUTO W/O SCOPE: CPT | Performed by: EMERGENCY MEDICINE

## 2020-03-09 PROCEDURE — 96361 HYDRATE IV INFUSION ADD-ON: CPT

## 2020-03-09 PROCEDURE — 93970 EXTREMITY STUDY: CPT | Performed by: SURGERY

## 2020-03-09 PROCEDURE — 80053 COMPREHEN METABOLIC PANEL: CPT | Performed by: EMERGENCY MEDICINE

## 2020-03-09 PROCEDURE — 85379 FIBRIN DEGRADATION QUANT: CPT | Performed by: EMERGENCY MEDICINE

## 2020-03-09 PROCEDURE — 36415 COLL VENOUS BLD VENIPUNCTURE: CPT | Performed by: EMERGENCY MEDICINE

## 2020-03-09 PROCEDURE — 71275 CT ANGIOGRAPHY CHEST: CPT

## 2020-03-09 PROCEDURE — 99284 EMERGENCY DEPT VISIT MOD MDM: CPT | Performed by: EMERGENCY MEDICINE

## 2020-03-09 PROCEDURE — 85027 COMPLETE CBC AUTOMATED: CPT | Performed by: EMERGENCY MEDICINE

## 2020-03-09 PROCEDURE — 83690 ASSAY OF LIPASE: CPT | Performed by: EMERGENCY MEDICINE

## 2020-03-09 PROCEDURE — 96360 HYDRATION IV INFUSION INIT: CPT

## 2020-03-09 RX ORDER — SODIUM CHLORIDE 9 MG/ML
500 INJECTION, SOLUTION INTRAVENOUS ONCE
Status: COMPLETED | OUTPATIENT
Start: 2020-03-09 | End: 2020-03-09

## 2020-03-09 RX ORDER — CEPHALEXIN 250 MG/1
500 CAPSULE ORAL ONCE
Status: COMPLETED | OUTPATIENT
Start: 2020-03-09 | End: 2020-03-09

## 2020-03-09 RX ORDER — CEPHALEXIN 500 MG/1
500 CAPSULE ORAL EVERY 6 HOURS SCHEDULED
Qty: 20 CAPSULE | Refills: 0 | Status: SHIPPED | OUTPATIENT
Start: 2020-03-09 | End: 2020-03-14

## 2020-03-09 RX ADMIN — IOHEXOL 100 ML: 350 INJECTION, SOLUTION INTRAVENOUS at 11:30

## 2020-03-09 RX ADMIN — SODIUM CHLORIDE 500 ML/HR: 0.9 INJECTION, SOLUTION INTRAVENOUS at 11:02

## 2020-03-09 RX ADMIN — CEPHALEXIN 500 MG: 250 CAPSULE ORAL at 14:25

## 2020-03-09 NOTE — ED PROVIDER NOTES
History  Chief Complaint   Patient presents with    Arm Swelling     Pt reports R arm swelling, recently had cyst removed from pancreas     HPI    Prior to Admission Medications   Prescriptions Last Dose Informant Patient Reported? Taking?   dexlansoprazole (Dexilant) 60 MG capsule   Yes No   Sig: Take 1 Cap by mouth daily  furosemide (Lasix) 20 mg tablet   Yes No   Sig: Take by mouth every 3 (three) days   levothyroxine 50 mcg tablet   Yes No   lisinopril (ZESTRIL) 20 mg tablet   Yes No   Sig: Take 20 mg by mouth daily   loratadine (CLARITIN) 10 mg tablet   Yes No   Sig: Take by mouth   magnesium (MAGTAB) 84 MG (7MEQ) TBCR   No No   Sig: Take 1 tablet (84 mg total) by mouth daily for 5 days   meclizine (ANTIVERT) 25 mg tablet   Yes No   Sig: Take by mouth   metFORMIN (GLUCOPHAGE) 1000 MG tablet   Yes No   Sig: Take by mouth daily   oxyCODONE (ROXICODONE) 15 mg immediate release tablet   Yes No   Sig: Take 15 mg by mouth every 8 (eight) hours as needed   simvastatin (ZOCOR) 40 mg tablet   Yes No   Sig: Take 1 Tab by mouth daily  tamsulosin (Flomax) 0 4 mg   Yes No   Sig: Take by mouth   venlafaxine (Effexor XR) 150 mg 24 hr capsule   Yes No   Sig: Take by mouth      Facility-Administered Medications: None       Past Medical History:   Diagnosis Date    Acute renal failure (ARF) (HCC)     BPH (benign prostatic hyperplasia)     [facility] [facility]     [facility]     Disease of thyroid gland     GERD (gastroesophageal reflux disease)     High cholesterol     Hypertension     IBS (irritable bowel syndrome)     Obstructive jaundice     Pancreatic necrosis        Past Surgical History:   Procedure Laterality Date    ABDOMINAL SURGERY         History reviewed  No pertinent family history  I have reviewed and agree with the history as documented      E-Cigarette/Vaping    E-Cigarette Use Never User      E-Cigarette/Vaping Substances     Social History     Tobacco Use    Smoking status: Never Smoker    Smokeless tobacco: Never Used   Substance Use Topics    Alcohol use: Never     Frequency: Never    Drug use: Never       Review of Systems    Physical Exam  Physical Exam    Vital Signs  ED Triage Vitals [03/09/20 1049]   Temperature Pulse Respirations Blood Pressure SpO2   98 1 °F (36 7 °C) 95 18 106/56 98 %      Temp Source Heart Rate Source Patient Position - Orthostatic VS BP Location FiO2 (%)   Temporal Monitor Lying Right arm --      Pain Score       No Pain           Vitals:    03/09/20 1230 03/09/20 1300 03/09/20 1400 03/09/20 1428   BP: 110/64 104/62  110/70   Pulse: 90 91 87 80   Patient Position - Orthostatic VS: Sitting Lying Sitting Sitting         Visual Acuity      ED Medications  Medications   sodium chloride 0 9 % infusion (0 mL/hr Intravenous Stopped 3/9/20 1415)   iohexol (OMNIPAQUE) 350 MG/ML injection (MULTI-DOSE) 100 mL (100 mL Intravenous Given 3/9/20 1130)   cephalexin (KEFLEX) capsule 500 mg (500 mg Oral Given 3/9/20 1425)       Diagnostic Studies  Results Reviewed     Procedure Component Value Units Date/Time    CBC and differential [[id number]]  (Abnormal) Collected:  03/09/20 1102    Lab Status:  Final result Specimen:  Blood from Arm, Right Updated:  03/09/20 1208     WBC 8 30 Thousand/uL      RBC 3 26 Million/uL      Hemoglobin 9 1 g/dL      Hematocrit 28 2 %      MCV 87 fL      MCH 27 9 pg      MCHC 32 3 g/dL      RDW 15 0 %      MPV 10 2 fL      Platelets 423 Thousands/uL      nRBC 0 /100 WBCs     Narrative: This is an appended report  These results have been appended to a previously verified report      Protime-INR [[id number]]  (Abnormal) Collected:  03/09/20 1102    Lab Status:  Final result Specimen:  Blood from Arm, Right Updated:  03/09/20 1123     Protime 16 8 seconds      INR 1 36    D-dimer, quantitative [[id number]]  (Abnormal) Collected:  03/09/20 1102    Lab Status:  Final result Specimen:  Blood from Arm, Right Updated:  03/09/20 1123     D-Dimer, Quant 1 15 ug/ml FEU     Troponin I [[id number]]  (Normal) Collected:  03/09/20 1102    Lab Status:  Final result Specimen:  Blood from Arm, Right Updated:  03/09/20 1114     Troponin I <0 02 ng/mL     Comprehensive metabolic panel [[id number]]  (Abnormal) Collected:  03/09/20 1102    Lab Status:  Final result Specimen:  Blood from Arm, Right Updated:  03/09/20 1114     Sodium 131 mmol/L      Potassium 4 1 mmol/L      Chloride 97 mmol/L      CO2 28 mmol/L      ANION GAP 6 mmol/L      BUN 13 mg/dL      Creatinine 0 98 mg/dL      Glucose 266 mg/dL      Calcium 8 3 mg/dL      AST 11 U/L      ALT 25 U/L      Alkaline Phosphatase 114 U/L      Total Protein 6 5 g/dL      Albumin 2 1 g/dL      Total Bilirubin 0 76 mg/dL      eGFR 83 ml/min/1 73sq m     Narrative:       [place] guidelines for Chronic Kidney Disease (CKD):     Stage 1 with normal or high GFR (GFR > 90 mL/min/1 73 square meters)    Stage 2 Mild CKD (GFR = 60-89 mL/min/1 73 square meters)    Stage 3A Moderate CKD (GFR = 45-59 mL/min/1 73 square meters)    Stage 3B Moderate CKD (GFR = 30-44 mL/min/1 73 square meters)    Stage 4 Severe CKD (GFR = 15-29 mL/min/1 73 square meters)    Stage 5 End Stage CKD (GFR <15 mL/min/1 73 square meters)  Note: GFR calculation is accurate only with a steady state creatinine    Lipase [[id number]]  (Abnormal) Collected:  03/09/20 1102    Lab Status:  Final result Specimen:  Blood from Arm, Right Updated:  03/09/20 1112     Lipase 59 u/L     UA w Reflex to Microscopic w Reflex to Culture [[id number]]  (Abnormal) Collected:  03/09/20 1103    Lab Status:  Final result Specimen:  Urine, Clean Catch Updated:  03/09/20 1111     Color, UA Yellow     Clarity, UA Clear     Specific Gravity, UA <=1 005     pH, UA 6 5     Leukocytes, UA Negative     Nitrite, UA Negative     Protein, UA Negative mg/dl      Glucose, UA Negative mg/dl      Ketones, UA Negative mg/dl      Urobilinogen, UA 4 0 E U /dl Bilirubin, UA Small     Blood, UA Negative                 VAS lower limb venous duplex study, complete bilateral   Final Result by Melanie Galvan DO (03/09 1650)      PE Study with CT abdomen & pelvis with contrast   Final Result by Melissa Luis MD (03/09 1247)         1  No pulmonary embolism  2   Findings most consistent with 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 encompassing the entire pancreatic body and tail and extending into the head and uncinate process as described above  Based on prior report from outside    imaging, pancreatic collection has decreased in size status post placement of stent extending from the stomach into fluid collection  Workstation performed: [id number]         XR shoulder 2+ views LEFT   Final Result by Vasquez Hernandez MD (03/09 1336)      No acute osseous abnormality  Workstation performed: [id number]         XR hand 3+ views LEFT   Final Result by Vasquez Hernandez MD (03/09 1338)      No acute osseous abnormality  Workstation performed: [id number]         VAS upper limb venous duplex scan, unilateral/limited    (Results Pending)              Procedures  Procedures         ED Course  ED Course as of Mar 09 1819   Mon Mar 09, 2020   1052 Epic downtime  Chart completed on paper  1230 Informed by vascular tech that patient has left upper extremity duplex is negative and bilateral lower extremity duplex are negative  1304 CTAP:IMPRESSION:        1  No pulmonary embolism         2  Findings most consistent with 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 encompassing the entire pancreatic body and tail and extending into the head and uncinate process as described above    Based on prior report from outside   imaging, pancreatic collection has decreased in size status post placement of stent extending from the stomach into fluid collection                1314 Left shoulder x-ray read and interpreted by me  No acute osseous abnormalities  1315 Left hand x-ray read interpreted by me  No acute osseous abnormalities  5959 Jing Ken patient  He has no complaints at this time  Reviewed all labs and imaging with him and will discharge home with PCP follow-up  Patient is also contacting Dr Rosie Austin who did his pancreatic stent surgery for follow-up this week  Will start on Keflex for mild left hand cellulitis  Strict return to ER precautions discussed with acknowledgement patient wife at bedside take patient home  MDM      Disposition  Final diagnoses:   Left arm cellulitis   Arm swelling   Elevated d-dimer   Leg swelling     Time reflects when diagnosis was documented in both MDM as applicable and the Disposition within this note     Time User Action Codes Description Comment    3/9/2020 12:35 PM Mahala Honey Add [J18 104,  M79 605] Bilateral leg pain     3/9/2020 12:35 PM Josephine Asif Add [M79 602] Pain, arm, left     3/9/2020  2:13 PM Harlo Quails Add [U78 019] Left arm cellulitis     3/9/2020  2:13 PM Harlo Quails Add [M79 89] Arm swelling     3/9/2020  2:14 PM Harlo Quails Add [Q68 229] Varicose veins of leg with swelling, bilateral     3/9/2020  2:14 PM Harlo Quails Add [R79 89] Elevated d-dimer     3/9/2020  6:19 PM Harlo Quails Remove [Y35 028] Varicose veins of leg with swelling, bilateral     3/9/2020  6:19 PM Harlo Quails Add [M79 89] Leg swelling       ED Disposition     ED Disposition Condition Date/Time Comment    Discharge Stable Mon Mar 9, 2020  2:13 PM Eric Heck discharge to home/self care  Follow-up Information     Follow up With Specialties Details Why Harshal Ken, DO Internal Medicine Call in 1 day Call your primary care doctor for your left arm and bilateral leg swelling and left arm cellulitis   [place] [address]  [address], MD Gastroenterology, Internal Medicine Call in 1 day Call Dr Rizzo Police today to schedule a follow-up appointment from your pancreas surgery  [address]      Norris Anne MD Internal Medicine, Cardiology Call  Call Cardiology today to be evaluated for congestive heart failure  [address] [facility]  [phone]            Discharge Medication List as of 3/9/2020  2:18 PM      START taking these medications    Details   cephalexin (KEFLEX) 500 mg capsule Take 1 capsule (500 mg total) by mouth every 6 (six) hours for 5 days, Starting Mon 3/9/2020, Until Sat 3/14/2020, Normal         CONTINUE these medications which have NOT CHANGED    Details   dexlansoprazole (Dexilant) 60 MG capsule Take 1 Cap by mouth daily  , Historical Med      furosemide (Lasix) 20 mg tablet Take by mouth every 3 (three) days, Historical Med      levothyroxine 50 mcg tablet Starting Thu 4/19/2018, Historical Med      lisinopril (ZESTRIL) 20 mg tablet Take 20 mg by mouth daily, Historical Med      loratadine (CLARITIN) 10 mg tablet Take by mouth, Starting Tue 9/1/2015, Historical Med      magnesium (MAGTAB) 84 MG (7MEQ) TBCR Take 1 tablet (84 mg total) by mouth daily for 5 days, Starting Sat 3/7/2020, Until Thu 3/12/2020, Normal      meclizine (ANTIVERT) 25 mg tablet Take by mouth, Historical Med      metFORMIN (GLUCOPHAGE) 1000 MG tablet Take by mouth daily, Historical Med      oxyCODONE (ROXICODONE) 15 mg immediate release tablet Take 15 mg by mouth every 8 (eight) hours as needed, Historical Med      simvastatin (ZOCOR) 40 mg tablet Take 1 Tab by mouth daily  , Historical Med      tamsulosin (Flomax) 0 4 mg Take by mouth, Historical Med      venlafaxine (Effexor XR) 150 mg 24 hr capsule Take by mouth, Historical Med           No discharge procedures on file      PDMP Review     None          ED Provider  Electronically Signed by    Dona Gunter MD Kathryn Nunez MD  03/09/20 9303

## 2020-03-10 PROCEDURE — 93971 EXTREMITY STUDY: CPT | Performed by: SURGERY

## 2020-10-28 ENCOUNTER — DOCTOR'S OFFICE (OUTPATIENT)
Dept: URBAN - NONMETROPOLITAN AREA CLINIC 1 | Facility: CLINIC | Age: 60
Setting detail: OPHTHALMOLOGY
End: 2020-10-28
Payer: COMMERCIAL

## 2020-10-28 VITALS — HEIGHT: 60 IN

## 2020-10-28 DIAGNOSIS — H04.121: ICD-10-CM

## 2020-10-28 DIAGNOSIS — H04.123: ICD-10-CM

## 2020-10-28 DIAGNOSIS — H01.001: ICD-10-CM

## 2020-10-28 DIAGNOSIS — H01.002: ICD-10-CM

## 2020-10-28 DIAGNOSIS — H04.122: ICD-10-CM

## 2020-10-28 DIAGNOSIS — H40.013: ICD-10-CM

## 2020-10-28 PROCEDURE — 92014 COMPRE OPH EXAM EST PT 1/>: CPT | Performed by: OPHTHALMOLOGY

## 2020-10-28 PROCEDURE — 83861 MICROFLUID ANALY TEARS: CPT | Performed by: OPHTHALMOLOGY

## 2020-10-28 PROCEDURE — 92133 CPTRZD OPH DX IMG PST SGM ON: CPT | Performed by: OPHTHALMOLOGY

## 2020-10-28 ASSESSMENT — REFRACTION_MANIFEST
OD_VA2: 20/20-1
OD_CYLINDER: -0.25
OD_AXIS: 115
OD_SPHERE: +0.25
OS_ADD: +2.50
OS_VA1: 20/20-1
OS_AXIS: 090
OS_CYLINDER: -0.25
OS_SPHERE: +0.75
OS_VA2: 20/20-1
OD_ADD: +2.50
OD_VA1: 20/20-1

## 2020-10-28 ASSESSMENT — PACHYMETRY
OD_CT_UM: 538
OS_CT_CORRECTION: 1
OS_CT_UM: 530
OD_CT_CORRECTION: 1

## 2020-10-28 ASSESSMENT — LID EXAM ASSESSMENTS
OD_COMMENTS: 1+ MGD
OD_BLEPHARITIS: RLL RUL T
OS_COMMENTS: 1+ MGD
OS_BLEPHARITIS: LLL LUL T

## 2020-10-28 ASSESSMENT — TONOMETRY
OS_IOP_MMHG: 12
OD_IOP_MMHG: 12

## 2020-10-28 ASSESSMENT — REFRACTION_AUTOREFRACTION
OS_CYLINDER: -0.25
OD_SPHERE: +0.50
OS_SPHERE: +1.00
OD_AXIS: 180
OS_AXIS: 64
OD_CYLINDER: 0.00

## 2020-10-28 ASSESSMENT — REFRACTION_CURRENTRX
OD_ADD: +2.75
OS_SPHERE: +0.50
OD_SPHERE: +0.25
OD_OVR_VA: 20/
OS_VPRISM_DIRECTION: BF
OS_CYLINDER: -0.25
OD_CYLINDER: -0.25
OS_OVR_VA: 20/
OS_AXIS: 078
OD_AXIS: 117
OS_ADD: +2.75
OD_VPRISM_DIRECTION: BF

## 2020-10-28 ASSESSMENT — SPHEQUIV_DERIVED
OS_SPHEQUIV: 0.625
OS_SPHEQUIV: 0.875
OD_SPHEQUIV: 0.5
OD_SPHEQUIV: 0.125

## 2020-10-28 ASSESSMENT — PUNCTA - ASSESSMENT
OD_PUNCTA: RLL MED
OS_PUNCTA: LLL MED

## 2020-10-28 ASSESSMENT — CONFRONTATIONAL VISUAL FIELD TEST (CVF)
OS_FINDINGS: FULL
OD_FINDINGS: FULL

## 2020-10-28 ASSESSMENT — VISUAL ACUITY
OD_BCVA: 20/25-1
OS_BCVA: 20/25-1

## 2020-10-28 ASSESSMENT — DRY EYES - PHYSICIAN NOTES
OD_GENERALCOMMENTS: KISICCA
OS_GENERALCOMMENTS: KISICCA

## 2020-12-23 ENCOUNTER — APPOINTMENT (EMERGENCY)
Dept: CT IMAGING | Facility: HOSPITAL | Age: 60
End: 2020-12-23
Payer: COMMERCIAL

## 2020-12-23 ENCOUNTER — HOSPITAL ENCOUNTER (EMERGENCY)
Facility: HOSPITAL | Age: 60
Discharge: ANOTHER HOSPITAL | End: 2020-12-24
Attending: STUDENT IN AN ORGANIZED HEALTH CARE EDUCATION/TRAINING PROGRAM | Admitting: STUDENT IN AN ORGANIZED HEALTH CARE EDUCATION/TRAINING PROGRAM
Payer: COMMERCIAL

## 2020-12-23 DIAGNOSIS — K85.90 ACUTE PANCREATITIS, UNSPECIFIED COMPLICATION STATUS, UNSPECIFIED PANCREATITIS TYPE: Primary | ICD-10-CM

## 2020-12-23 DIAGNOSIS — K25.3 ACUTE GASTRIC ULCER WITHOUT HEMORRHAGE OR PERFORATION: ICD-10-CM

## 2020-12-23 DIAGNOSIS — R74.01 TRANSAMINITIS: ICD-10-CM

## 2020-12-23 LAB
ALBUMIN SERPL BCP-MCNC: 3.3 G/DL (ref 3.5–5)
ALP SERPL-CCNC: 852 U/L (ref 46–116)
ALT SERPL W P-5'-P-CCNC: 857 U/L (ref 12–78)
ANION GAP SERPL CALCULATED.3IONS-SCNC: 7 MMOL/L (ref 4–13)
AST SERPL W P-5'-P-CCNC: 607 U/L (ref 5–45)
BASE EX.OXY STD BLDV CALC-SCNC: 83.3 % (ref 60–80)
BASE EXCESS BLDV CALC-SCNC: 1.2 MMOL/L
BASOPHILS # BLD AUTO: 0.06 THOUSANDS/ΜL (ref 0–0.1)
BASOPHILS NFR BLD AUTO: 1 % (ref 0–1)
BILIRUB DIRECT SERPL-MCNC: 3.58 MG/DL (ref 0–0.2)
BILIRUB SERPL-MCNC: 4.55 MG/DL (ref 0.2–1)
BUN SERPL-MCNC: 17 MG/DL (ref 5–25)
CALCIUM SERPL-MCNC: 9.9 MG/DL (ref 8.3–10.1)
CHLORIDE SERPL-SCNC: 92 MMOL/L (ref 100–108)
CO2 SERPL-SCNC: 27 MMOL/L (ref 21–32)
CREAT SERPL-MCNC: 0.94 MG/DL (ref 0.6–1.3)
EOSINOPHIL # BLD AUTO: 0.05 THOUSAND/ΜL (ref 0–0.61)
EOSINOPHIL NFR BLD AUTO: 1 % (ref 0–6)
ERYTHROCYTE [DISTWIDTH] IN BLOOD BY AUTOMATED COUNT: 13.3 % (ref 11.6–15.1)
GFR SERPL CREATININE-BSD FRML MDRD: 87 ML/MIN/1.73SQ M
GLUCOSE SERPL-MCNC: 347 MG/DL (ref 65–140)
HCO3 BLDV-SCNC: 24.3 MMOL/L (ref 24–30)
HCT VFR BLD AUTO: 39.7 % (ref 36.5–49.3)
HGB BLD-MCNC: 13.5 G/DL (ref 12–17)
IMM GRANULOCYTES # BLD AUTO: 0.18 THOUSAND/UL (ref 0–0.2)
IMM GRANULOCYTES NFR BLD AUTO: 2 % (ref 0–2)
LACTATE SERPL-SCNC: 1.4 MMOL/L (ref 0.5–2)
LIPASE SERPL-CCNC: 1157 U/L (ref 73–393)
LYMPHOCYTES # BLD AUTO: 1.33 THOUSANDS/ΜL (ref 0.6–4.47)
LYMPHOCYTES NFR BLD AUTO: 16 % (ref 14–44)
MCH RBC QN AUTO: 28.9 PG (ref 26.8–34.3)
MCHC RBC AUTO-ENTMCNC: 34 G/DL (ref 31.4–37.4)
MCV RBC AUTO: 85 FL (ref 82–98)
MONOCYTES # BLD AUTO: 0.93 THOUSAND/ΜL (ref 0.17–1.22)
MONOCYTES NFR BLD AUTO: 11 % (ref 4–12)
NEUTROPHILS # BLD AUTO: 5.97 THOUSANDS/ΜL (ref 1.85–7.62)
NEUTS SEG NFR BLD AUTO: 69 % (ref 43–75)
NRBC BLD AUTO-RTO: 0 /100 WBCS
O2 CT BLDV-SCNC: 16.8 ML/DL
PCO2 BLDV: 33.9 MM HG (ref 42–50)
PH BLDV: 7.47 [PH] (ref 7.3–7.4)
PLATELET # BLD AUTO: 251 THOUSANDS/UL (ref 149–390)
PMV BLD AUTO: 10.2 FL (ref 8.9–12.7)
PO2 BLDV: 44.9 MM HG (ref 35–45)
POTASSIUM SERPL-SCNC: 4.5 MMOL/L (ref 3.5–5.3)
PROT SERPL-MCNC: 7.5 G/DL (ref 6.4–8.2)
RBC # BLD AUTO: 4.67 MILLION/UL (ref 3.88–5.62)
SODIUM SERPL-SCNC: 126 MMOL/L (ref 136–145)
WBC # BLD AUTO: 8.52 THOUSAND/UL (ref 4.31–10.16)

## 2020-12-23 PROCEDURE — 96365 THER/PROPH/DIAG IV INF INIT: CPT

## 2020-12-23 PROCEDURE — 83605 ASSAY OF LACTIC ACID: CPT | Performed by: STUDENT IN AN ORGANIZED HEALTH CARE EDUCATION/TRAINING PROGRAM

## 2020-12-23 PROCEDURE — 83690 ASSAY OF LIPASE: CPT | Performed by: STUDENT IN AN ORGANIZED HEALTH CARE EDUCATION/TRAINING PROGRAM

## 2020-12-23 PROCEDURE — 96361 HYDRATE IV INFUSION ADD-ON: CPT

## 2020-12-23 PROCEDURE — 36415 COLL VENOUS BLD VENIPUNCTURE: CPT | Performed by: STUDENT IN AN ORGANIZED HEALTH CARE EDUCATION/TRAINING PROGRAM

## 2020-12-23 PROCEDURE — G1004 CDSM NDSC: HCPCS

## 2020-12-23 PROCEDURE — 80048 BASIC METABOLIC PNL TOTAL CA: CPT | Performed by: STUDENT IN AN ORGANIZED HEALTH CARE EDUCATION/TRAINING PROGRAM

## 2020-12-23 PROCEDURE — 96375 TX/PRO/DX INJ NEW DRUG ADDON: CPT

## 2020-12-23 PROCEDURE — 82805 BLOOD GASES W/O2 SATURATION: CPT | Performed by: STUDENT IN AN ORGANIZED HEALTH CARE EDUCATION/TRAINING PROGRAM

## 2020-12-23 PROCEDURE — 99285 EMERGENCY DEPT VISIT HI MDM: CPT

## 2020-12-23 PROCEDURE — 81001 URINALYSIS AUTO W/SCOPE: CPT | Performed by: STUDENT IN AN ORGANIZED HEALTH CARE EDUCATION/TRAINING PROGRAM

## 2020-12-23 PROCEDURE — 74177 CT ABD & PELVIS W/CONTRAST: CPT

## 2020-12-23 PROCEDURE — 99285 EMERGENCY DEPT VISIT HI MDM: CPT | Performed by: STUDENT IN AN ORGANIZED HEALTH CARE EDUCATION/TRAINING PROGRAM

## 2020-12-23 PROCEDURE — 85025 COMPLETE CBC W/AUTO DIFF WBC: CPT | Performed by: STUDENT IN AN ORGANIZED HEALTH CARE EDUCATION/TRAINING PROGRAM

## 2020-12-23 PROCEDURE — 80076 HEPATIC FUNCTION PANEL: CPT | Performed by: STUDENT IN AN ORGANIZED HEALTH CARE EDUCATION/TRAINING PROGRAM

## 2020-12-23 RX ORDER — MORPHINE SULFATE 10 MG/ML
6 INJECTION, SOLUTION INTRAMUSCULAR; INTRAVENOUS ONCE
Status: COMPLETED | OUTPATIENT
Start: 2020-12-23 | End: 2020-12-23

## 2020-12-23 RX ORDER — SODIUM CHLORIDE, SODIUM GLUCONATE, SODIUM ACETATE, POTASSIUM CHLORIDE, MAGNESIUM CHLORIDE, SODIUM PHOSPHATE, DIBASIC, AND POTASSIUM PHOSPHATE .53; .5; .37; .037; .03; .012; .00082 G/100ML; G/100ML; G/100ML; G/100ML; G/100ML; G/100ML; G/100ML
75 INJECTION, SOLUTION INTRAVENOUS CONTINUOUS
Status: DISCONTINUED | OUTPATIENT
Start: 2020-12-23 | End: 2020-12-24 | Stop reason: HOSPADM

## 2020-12-23 RX ORDER — ONDANSETRON 2 MG/ML
4 INJECTION INTRAMUSCULAR; INTRAVENOUS ONCE
Status: COMPLETED | OUTPATIENT
Start: 2020-12-23 | End: 2020-12-23

## 2020-12-23 RX ADMIN — ONDANSETRON 4 MG: 2 INJECTION INTRAMUSCULAR; INTRAVENOUS at 20:54

## 2020-12-23 RX ADMIN — SODIUM CHLORIDE, SODIUM GLUCONATE, SODIUM ACETATE, POTASSIUM CHLORIDE, MAGNESIUM CHLORIDE, SODIUM PHOSPHATE, DIBASIC, AND POTASSIUM PHOSPHATE 75 ML/HR: .53; .5; .37; .037; .03; .012; .00082 INJECTION, SOLUTION INTRAVENOUS at 23:56

## 2020-12-23 RX ADMIN — SODIUM CHLORIDE 1000 ML: 0.9 INJECTION, SOLUTION INTRAVENOUS at 20:54

## 2020-12-23 RX ADMIN — MORPHINE SULFATE 6 MG: 10 INJECTION INTRAVENOUS at 21:33

## 2020-12-23 RX ADMIN — IOHEXOL 100 ML: 350 INJECTION, SOLUTION INTRAVENOUS at 21:32

## 2020-12-24 VITALS
DIASTOLIC BLOOD PRESSURE: 73 MMHG | RESPIRATION RATE: 14 BRPM | HEART RATE: 82 BPM | OXYGEN SATURATION: 94 % | BODY MASS INDEX: 28.66 KG/M2 | SYSTOLIC BLOOD PRESSURE: 114 MMHG | WEIGHT: 188.49 LBS | TEMPERATURE: 97.9 F

## 2020-12-24 LAB
AMORPH PHOS CRY URNS QL MICRO: ABNORMAL /HPF
BACTERIA UR QL AUTO: ABNORMAL /HPF
BILIRUB UR QL STRIP: ABNORMAL
CLARITY UR: ABNORMAL
COLOR UR: YELLOW
GLUCOSE UR STRIP-MCNC: ABNORMAL MG/DL
HGB UR QL STRIP.AUTO: NEGATIVE
KETONES UR STRIP-MCNC: ABNORMAL MG/DL
LEUKOCYTE ESTERASE UR QL STRIP: NEGATIVE
NITRITE UR QL STRIP: NEGATIVE
NON-SQ EPI CELLS URNS QL MICRO: ABNORMAL /HPF
PH UR STRIP.AUTO: 7.5 [PH]
PROT UR STRIP-MCNC: NEGATIVE MG/DL
RBC #/AREA URNS AUTO: ABNORMAL /HPF
SP GR UR STRIP.AUTO: 1.01 (ref 1–1.03)
UROBILINOGEN UR QL STRIP.AUTO: 2 E.U./DL
WBC #/AREA URNS AUTO: ABNORMAL /HPF

## 2020-12-24 PROCEDURE — 96366 THER/PROPH/DIAG IV INF ADDON: CPT

## 2020-12-24 PROCEDURE — 96368 THER/DIAG CONCURRENT INF: CPT

## 2020-12-24 RX ADMIN — SODIUM CHLORIDE 3.38 G: 9 INJECTION, SOLUTION INTRAVENOUS at 00:09

## 2021-01-19 ENCOUNTER — DOCTOR'S OFFICE (OUTPATIENT)
Dept: URBAN - NONMETROPOLITAN AREA CLINIC 1 | Facility: CLINIC | Age: 61
Setting detail: OPHTHALMOLOGY
End: 2021-01-19
Payer: COMMERCIAL

## 2021-01-19 ENCOUNTER — OPTICAL OFFICE (OUTPATIENT)
Dept: URBAN - NONMETROPOLITAN AREA CLINIC 4 | Facility: CLINIC | Age: 61
Setting detail: OPHTHALMOLOGY
End: 2021-01-19
Payer: COMMERCIAL

## 2021-01-19 VITALS — HEIGHT: 60 IN

## 2021-01-19 DIAGNOSIS — H52.223: ICD-10-CM

## 2021-01-19 DIAGNOSIS — H52.03: ICD-10-CM

## 2021-01-19 DIAGNOSIS — H52.4: ICD-10-CM

## 2021-01-19 PROCEDURE — V2744 TINT PHOTOCHROMATIC LENS/ES: HCPCS | Performed by: OPTOMETRIST

## 2021-01-19 PROCEDURE — V2203 LENS SPHCYL BIFOCAL 4.00D/.1: HCPCS | Performed by: OPTOMETRIST

## 2021-01-19 PROCEDURE — 92015 DETERMINE REFRACTIVE STATE: CPT | Performed by: OPTOMETRIST

## 2021-01-19 PROCEDURE — V2020 VISION SVCS FRAMES PURCHASES: HCPCS | Performed by: OPTOMETRIST

## 2021-01-19 ASSESSMENT — REFRACTION_AUTOREFRACTION
OD_AXIS: 105
OS_CYLINDER: -0.50
OS_AXIS: 75
OD_SPHERE: +1.25
OS_SPHERE: +1.25
OD_CYLINDER: -0.75

## 2021-01-19 ASSESSMENT — REFRACTION_CURRENTRX
OS_OVR_VA: 20/
OS_CYLINDER: -0.25
OD_CYLINDER: -0.25
OD_OVR_VA: 20/
OD_VPRISM_DIRECTION: BF
OD_ADD: +2.75
OS_AXIS: 078
OD_SPHERE: +0.25
OS_VPRISM_DIRECTION: BF
OS_SPHERE: +0.50
OD_AXIS: 117
OS_ADD: +2.75

## 2021-01-19 ASSESSMENT — REFRACTION_MANIFEST
OS_VA2: 20/20-1
OD_ADD: +2.50
OD_SPHERE: +1.25
OD_VA2: 20/20-1
OS_SPHERE: +1.25
OD_CYLINDER: -0.75
OD_AXIS: 105
OS_VA1: 20/20-1
OS_ADD: +2.50
OS_AXIS: 075
OS_CYLINDER: -0.50
OD_VA1: 20/25-2

## 2021-01-19 ASSESSMENT — SPHEQUIV_DERIVED
OD_SPHEQUIV: 0.875
OS_SPHEQUIV: 1
OD_SPHEQUIV: 0.875
OS_SPHEQUIV: 1

## 2021-01-19 ASSESSMENT — VISUAL ACUITY
OD_BCVA: 20/25-2
OS_BCVA: 20/40-2

## 2021-07-07 ENCOUNTER — OPTICAL OFFICE (OUTPATIENT)
Dept: URBAN - NONMETROPOLITAN AREA CLINIC 4 | Facility: CLINIC | Age: 61
Setting detail: OPHTHALMOLOGY
End: 2021-07-07
Payer: COMMERCIAL

## 2021-07-07 ENCOUNTER — DOCTOR'S OFFICE (OUTPATIENT)
Dept: URBAN - NONMETROPOLITAN AREA CLINIC 1 | Facility: CLINIC | Age: 61
Setting detail: OPHTHALMOLOGY
End: 2021-07-07
Payer: COMMERCIAL

## 2021-07-07 DIAGNOSIS — H43.813: ICD-10-CM

## 2021-07-07 DIAGNOSIS — H40.013: ICD-10-CM

## 2021-07-07 DIAGNOSIS — E11.9: ICD-10-CM

## 2021-07-07 DIAGNOSIS — H25.13: ICD-10-CM

## 2021-07-07 DIAGNOSIS — H04.123: ICD-10-CM

## 2021-07-07 DIAGNOSIS — H52.223: ICD-10-CM

## 2021-07-07 PROCEDURE — 92134 CPTRZ OPH DX IMG PST SGM RTA: CPT | Performed by: OPHTHALMOLOGY

## 2021-07-07 PROCEDURE — V2203 LENS SPHCYL BIFOCAL 4.00D/.1: HCPCS | Performed by: OPTOMETRIST

## 2021-07-07 PROCEDURE — 92014 COMPRE OPH EXAM EST PT 1/>: CPT | Performed by: OPHTHALMOLOGY

## 2021-07-07 PROCEDURE — V2020 VISION SVCS FRAMES PURCHASES: HCPCS | Performed by: OPTOMETRIST

## 2021-07-07 PROCEDURE — V2744 TINT PHOTOCHROMATIC LENS/ES: HCPCS | Performed by: OPTOMETRIST

## 2021-07-07 ASSESSMENT — REFRACTION_AUTOREFRACTION
OS_SPHERE: +1.00
OS_AXIS: 087
OD_CYLINDER: -1.00
OS_CYLINDER: -0.50
OD_SPHERE: +1.50
OD_AXIS: 104

## 2021-07-07 ASSESSMENT — REFRACTION_MANIFEST
OS_SPHERE: +1.25
OD_CYLINDER: -0.75
OS_ADD: +2.50
OD_VA1: 20/25-2
OS_CYLINDER: -0.50
OD_SPHERE: +1.25
OS_AXIS: 075
OS_VA1: 20/20-1
OD_ADD: +2.50
OD_VA2: 20/20-1
OS_VA2: 20/20-1
OD_AXIS: 105

## 2021-07-07 ASSESSMENT — REFRACTION_CURRENTRX
OS_VPRISM_DIRECTION: BF
OD_CYLINDER: -0.75
OS_AXIS: 077
OD_OVR_VA: 20/
OS_ADD: +2.75
OD_AXIS: 106
OS_OVR_VA: 20/
OS_SPHERE: +1.25
OD_VPRISM_DIRECTION: BF
OD_SPHERE: +1.25
OD_ADD: +2.50
OS_CYLINDER: -0.50

## 2021-07-07 ASSESSMENT — KERATOMETRY
OD_AXISANGLE_DEGREES: 107
OS_AXISANGLE_DEGREES: 072
OD_K2POWER_DIOPTERS: 41.75
OS_K2POWER_DIOPTERS: 43.00
OS_K1POWER_DIOPTERS: 42.50
OD_K1POWER_DIOPTERS: 41.50

## 2021-07-07 ASSESSMENT — DRY EYES - PHYSICIAN NOTES
OD_GENERALCOMMENTS: 2+ PEE
OS_GENERALCOMMENTS: 2+ PEE

## 2021-07-07 ASSESSMENT — TONOMETRY
OD_IOP_MMHG: 14
OS_IOP_MMHG: 13

## 2021-07-07 ASSESSMENT — SPHEQUIV_DERIVED
OS_SPHEQUIV: 0.75
OD_SPHEQUIV: 1
OS_SPHEQUIV: 1
OD_SPHEQUIV: 0.875

## 2021-07-07 ASSESSMENT — PACHYMETRY
OD_CT_UM: 538
OS_CT_UM: 530
OD_CT_CORRECTION: 1
OS_CT_CORRECTION: 1

## 2021-07-07 ASSESSMENT — LID EXAM ASSESSMENTS
OS_COMMENTS: 1+ MGD
OD_COMMENTS: 1+ MGD
OS_BLEPHARITIS: LLL LUL T
OD_BLEPHARITIS: RLL RUL T

## 2021-07-07 ASSESSMENT — VISUAL ACUITY
OD_BCVA: 20/25-2
OS_BCVA: 20/25

## 2021-07-07 ASSESSMENT — CONFRONTATIONAL VISUAL FIELD TEST (CVF)
OD_FINDINGS: FULL
OS_FINDINGS: FULL

## 2021-07-07 ASSESSMENT — AXIALLENGTH_DERIVED
OS_AL: 23.4783
OD_AL: 23.8948
OD_AL: 23.9449
OS_AL: 23.5752

## 2021-07-07 ASSESSMENT — PUNCTA - ASSESSMENT
OS_PUNCTA: LLL MED
OD_PUNCTA: RLL MED

## 2021-09-07 ENCOUNTER — HOSPITAL ENCOUNTER (EMERGENCY)
Facility: HOSPITAL | Age: 61
Discharge: HOME | End: 2021-09-07
Attending: EMERGENCY MEDICINE | Admitting: EMERGENCY MEDICINE
Payer: COMMERCIAL

## 2021-09-07 ENCOUNTER — APPOINTMENT (EMERGENCY)
Dept: RADIOLOGY | Facility: HOSPITAL | Age: 61
End: 2021-09-07
Payer: COMMERCIAL

## 2021-09-07 ENCOUNTER — APPOINTMENT (EMERGENCY)
Dept: CT IMAGING | Facility: HOSPITAL | Age: 61
End: 2021-09-07
Payer: COMMERCIAL

## 2021-09-07 VITALS
HEART RATE: 95 BPM | SYSTOLIC BLOOD PRESSURE: 126 MMHG | HEIGHT: 68 IN | WEIGHT: 219 LBS | BODY MASS INDEX: 33.19 KG/M2 | DIASTOLIC BLOOD PRESSURE: 74 MMHG | OXYGEN SATURATION: 98 % | RESPIRATION RATE: 28 BRPM | TEMPERATURE: 97.7 F

## 2021-09-07 DIAGNOSIS — R10.13 EPIGASTRIC PAIN: Primary | ICD-10-CM

## 2021-09-07 LAB
ALBUMIN SERPL BCP-MCNC: 3.7 G/DL (ref 3.5–5)
ALP SERPL-CCNC: 69 U/L (ref 46–116)
ALT SERPL W P-5'-P-CCNC: 18 U/L (ref 12–78)
ANION GAP SERPL CALCULATED.3IONS-SCNC: 8 MMOL/L (ref 4–13)
APTT PPP: 35 SECONDS (ref 23–37)
AST SERPL W P-5'-P-CCNC: 10 U/L (ref 5–45)
BASOPHILS # BLD AUTO: 0.04 THOUSANDS/ΜL (ref 0–0.1)
BASOPHILS NFR BLD AUTO: 0 % (ref 0–1)
BILIRUB SERPL-MCNC: 0.41 MG/DL (ref 0.2–1)
BILIRUB UR QL STRIP: NEGATIVE
BUN SERPL-MCNC: 10 MG/DL (ref 5–25)
CALCIUM SERPL-MCNC: 8.7 MG/DL (ref 8.3–10.1)
CHLORIDE SERPL-SCNC: 98 MMOL/L (ref 100–108)
CLARITY UR: CLEAR
CO2 SERPL-SCNC: 30 MMOL/L (ref 21–32)
COLOR UR: YELLOW
CREAT SERPL-MCNC: 0.99 MG/DL (ref 0.6–1.3)
EOSINOPHIL # BLD AUTO: 0.07 THOUSAND/ΜL (ref 0–0.61)
EOSINOPHIL NFR BLD AUTO: 1 % (ref 0–6)
ERYTHROCYTE [DISTWIDTH] IN BLOOD BY AUTOMATED COUNT: 12.5 % (ref 11.6–15.1)
GFR SERPL CREATININE-BSD FRML MDRD: 82 ML/MIN/1.73SQ M
GLUCOSE SERPL-MCNC: 184 MG/DL (ref 65–140)
GLUCOSE UR STRIP-MCNC: NEGATIVE MG/DL
HCT VFR BLD AUTO: 39.8 % (ref 36.5–49.3)
HGB BLD-MCNC: 13.4 G/DL (ref 12–17)
HGB UR QL STRIP.AUTO: NEGATIVE
IMM GRANULOCYTES # BLD AUTO: 0.12 THOUSAND/UL (ref 0–0.2)
IMM GRANULOCYTES NFR BLD AUTO: 1 % (ref 0–2)
INR PPP: 1.14 (ref 0.84–1.19)
KETONES UR STRIP-MCNC: NEGATIVE MG/DL
LACTATE SERPL-SCNC: 1.8 MMOL/L (ref 0.5–2)
LEUKOCYTE ESTERASE UR QL STRIP: NEGATIVE
LIPASE SERPL-CCNC: 162 U/L (ref 73–393)
LYMPHOCYTES # BLD AUTO: 1.87 THOUSANDS/ΜL (ref 0.6–4.47)
LYMPHOCYTES NFR BLD AUTO: 17 % (ref 14–44)
MCH RBC QN AUTO: 30 PG (ref 26.8–34.3)
MCHC RBC AUTO-ENTMCNC: 33.7 G/DL (ref 31.4–37.4)
MCV RBC AUTO: 89 FL (ref 82–98)
MONOCYTES # BLD AUTO: 1.19 THOUSAND/ΜL (ref 0.17–1.22)
MONOCYTES NFR BLD AUTO: 11 % (ref 4–12)
NEUTROPHILS # BLD AUTO: 7.5 THOUSANDS/ΜL (ref 1.85–7.62)
NEUTS SEG NFR BLD AUTO: 70 % (ref 43–75)
NITRITE UR QL STRIP: NEGATIVE
NRBC BLD AUTO-RTO: 0 /100 WBCS
PH UR STRIP.AUTO: 6.5 [PH]
PLATELET # BLD AUTO: 204 THOUSANDS/UL (ref 149–390)
PMV BLD AUTO: 9.1 FL (ref 8.9–12.7)
POTASSIUM SERPL-SCNC: 4.3 MMOL/L (ref 3.5–5.3)
PROT SERPL-MCNC: 7.3 G/DL (ref 6.4–8.2)
PROT UR STRIP-MCNC: NEGATIVE MG/DL
PROTHROMBIN TIME: 14.5 SECONDS (ref 11.6–14.5)
RBC # BLD AUTO: 4.47 MILLION/UL (ref 3.88–5.62)
SARS-COV-2 RNA RESP QL NAA+PROBE: NEGATIVE
SODIUM SERPL-SCNC: 136 MMOL/L (ref 136–145)
SP GR UR STRIP.AUTO: 1.01 (ref 1–1.03)
TROPONIN I SERPL-MCNC: <0.02 NG/ML
UROBILINOGEN UR QL STRIP.AUTO: 0.2 E.U./DL
WBC # BLD AUTO: 10.79 THOUSAND/UL (ref 4.31–10.16)

## 2021-09-07 PROCEDURE — C9113 INJ PANTOPRAZOLE SODIUM, VIA: HCPCS | Performed by: EMERGENCY MEDICINE

## 2021-09-07 PROCEDURE — 99285 EMERGENCY DEPT VISIT HI MDM: CPT | Performed by: EMERGENCY MEDICINE

## 2021-09-07 PROCEDURE — 36415 COLL VENOUS BLD VENIPUNCTURE: CPT | Performed by: EMERGENCY MEDICINE

## 2021-09-07 PROCEDURE — 85730 THROMBOPLASTIN TIME PARTIAL: CPT | Performed by: EMERGENCY MEDICINE

## 2021-09-07 PROCEDURE — 74177 CT ABD & PELVIS W/CONTRAST: CPT

## 2021-09-07 PROCEDURE — 85025 COMPLETE CBC W/AUTO DIFF WBC: CPT | Performed by: EMERGENCY MEDICINE

## 2021-09-07 PROCEDURE — 83690 ASSAY OF LIPASE: CPT | Performed by: EMERGENCY MEDICINE

## 2021-09-07 PROCEDURE — 99284 EMERGENCY DEPT VISIT MOD MDM: CPT

## 2021-09-07 PROCEDURE — 96374 THER/PROPH/DIAG INJ IV PUSH: CPT

## 2021-09-07 PROCEDURE — U0003 INFECTIOUS AGENT DETECTION BY NUCLEIC ACID (DNA OR RNA); SEVERE ACUTE RESPIRATORY SYNDROME CORONAVIRUS 2 (SARS-COV-2) (CORONAVIRUS DISEASE [COVID-19]), AMPLIFIED PROBE TECHNIQUE, MAKING USE OF HIGH THROUGHPUT TECHNOLOGIES AS DESCRIBED BY CMS-2020-01-R: HCPCS | Performed by: EMERGENCY MEDICINE

## 2021-09-07 PROCEDURE — 96361 HYDRATE IV INFUSION ADD-ON: CPT

## 2021-09-07 PROCEDURE — U0005 INFEC AGEN DETEC AMPLI PROBE: HCPCS | Performed by: EMERGENCY MEDICINE

## 2021-09-07 PROCEDURE — 84484 ASSAY OF TROPONIN QUANT: CPT | Performed by: EMERGENCY MEDICINE

## 2021-09-07 PROCEDURE — 93005 ELECTROCARDIOGRAM TRACING: CPT

## 2021-09-07 PROCEDURE — 85610 PROTHROMBIN TIME: CPT | Performed by: EMERGENCY MEDICINE

## 2021-09-07 PROCEDURE — 81003 URINALYSIS AUTO W/O SCOPE: CPT | Performed by: EMERGENCY MEDICINE

## 2021-09-07 PROCEDURE — 83605 ASSAY OF LACTIC ACID: CPT | Performed by: EMERGENCY MEDICINE

## 2021-09-07 PROCEDURE — 71045 X-RAY EXAM CHEST 1 VIEW: CPT

## 2021-09-07 PROCEDURE — 80053 COMPREHEN METABOLIC PANEL: CPT | Performed by: EMERGENCY MEDICINE

## 2021-09-07 RX ORDER — MAGNESIUM HYDROXIDE/ALUMINUM HYDROXICE/SIMETHICONE 120; 1200; 1200 MG/30ML; MG/30ML; MG/30ML
30 SUSPENSION ORAL ONCE
Status: COMPLETED | OUTPATIENT
Start: 2021-09-07 | End: 2021-09-07

## 2021-09-07 RX ORDER — LIDOCAINE HYDROCHLORIDE 20 MG/ML
15 SOLUTION OROPHARYNGEAL ONCE
Status: COMPLETED | OUTPATIENT
Start: 2021-09-07 | End: 2021-09-07

## 2021-09-07 RX ORDER — PANTOPRAZOLE SODIUM 40 MG/1
40 INJECTION, POWDER, FOR SOLUTION INTRAVENOUS ONCE
Status: COMPLETED | OUTPATIENT
Start: 2021-09-07 | End: 2021-09-07

## 2021-09-07 RX ADMIN — IOHEXOL 100 ML: 350 INJECTION, SOLUTION INTRAVENOUS at 18:30

## 2021-09-07 RX ADMIN — ALUMINUM HYDROXIDE, MAGNESIUM HYDROXIDE, AND SIMETHICONE 30 ML: 200; 200; 20 SUSPENSION ORAL at 18:06

## 2021-09-07 RX ADMIN — SODIUM CHLORIDE 1000 ML: 0.9 INJECTION, SOLUTION INTRAVENOUS at 18:04

## 2021-09-07 RX ADMIN — LIDOCAINE HYDROCHLORIDE 15 ML: 20 SOLUTION ORAL; TOPICAL at 18:06

## 2021-09-07 RX ADMIN — PANTOPRAZOLE SODIUM 40 MG: 40 INJECTION, POWDER, FOR SOLUTION INTRAVENOUS at 18:02

## 2021-09-07 NOTE — ED PROVIDER NOTES
History  Chief Complaint   Patient presents with    Generalized Body Aches     pt c/o generalized body aches since saturday morning  Patient with a past history of pancreatitis and portal vein thrombosis  Still on Eliquis  Patient now complains of a 2 to 3 day history epigastric pain going to his back  No nausea or vomiting  No fevers or chills  No cough or cold symptoms  No change with eating  States it does not feel like his past pancreatitis pain  No dysuria frequency  Nothing taken for symptoms  No diarrhea  States his stools the other day was  green in color  No black or tarry stools  Appetite is unchanged  History provided by:  Patient   used: No    Abdominal Pain  Pain location:  Epigastric  Pain quality: aching    Pain radiates to:  Back  Pain severity:  Mild  Onset quality:  Gradual  Duration:  3 days  Timing:  Constant  Progression:  Unchanged  Chronicity:  New  Context: not diet changes, not recent illness and not sick contacts    Relieved by:  Nothing  Worsened by:  Palpation  Ineffective treatments:  None tried  Associated symptoms: no anorexia, no chest pain, no chills, no constipation, no cough, no diarrhea, no dysuria, no fever, no hematochezia, no hematuria, no melena, no nausea, no shortness of breath, no sore throat and no vomiting        Prior to Admission Medications   Prescriptions Last Dose Informant Patient Reported? Taking?   dexlansoprazole (Dexilant) 60 MG capsule   Yes No   Sig: Take 1 Cap by mouth daily     furosemide (Lasix) 20 mg tablet   Yes No   Sig: Take by mouth every 3 (three) days   levothyroxine 50 mcg tablet   Yes No   lisinopril (ZESTRIL) 20 mg tablet   Yes No   Sig: Take 20 mg by mouth daily   loratadine (CLARITIN) 10 mg tablet   Yes No   Sig: Take by mouth   magnesium (MAGTAB) 84 MG (7MEQ) TBCR   No No   Sig: Take 1 tablet (84 mg total) by mouth daily for 5 days   meclizine (ANTIVERT) 25 mg tablet   Yes No   Sig: Take by mouth metFORMIN (GLUCOPHAGE) 1000 MG tablet   Yes No   Sig: Take by mouth daily   oxyCODONE (ROXICODONE) 15 mg immediate release tablet   Yes No   Sig: Take 15 mg by mouth every 8 (eight) hours as needed   simvastatin (ZOCOR) 40 mg tablet   Yes No   Sig: Take 1 Tab by mouth daily  tamsulosin (Flomax) 0 4 mg   Yes No   Sig: Take by mouth   venlafaxine (Effexor XR) 150 mg 24 hr capsule   Yes No   Sig: Take by mouth      Facility-Administered Medications: None       Past Medical History:   Diagnosis Date    Acute renal failure (ARF) (HCC)     BPH (benign prostatic hyperplasia)     Chronic pancreatitis ([address] )     Diabetes mellitus ([facility] )     Disease of thyroid gland     GERD (gastroesophageal reflux disease)     High cholesterol     Hypertension     IBS (irritable bowel syndrome)     Obstructive jaundice     Pancreatic necrosis        Past Surgical History:   Procedure Laterality Date    ABDOMINAL SURGERY      CHOLECYSTECTOMY      PANCREATIC CYST DRAINAGE         History reviewed  No pertinent family history  I have reviewed and agree with the history as documented  E-Cigarette/Vaping    E-Cigarette Use Never User      E-Cigarette/Vaping Substances     Social History     Tobacco Use    Smoking status: Never Smoker    Smokeless tobacco: Never Used   Vaping Use    Vaping Use: Never used   Substance Use Topics    Alcohol use: Never    Drug use: Never       Review of Systems   Constitutional: Negative for chills and fever  HENT: Negative for ear pain, hearing loss, sore throat, trouble swallowing and voice change  Eyes: Negative for pain and discharge  Respiratory: Negative for cough, shortness of breath and wheezing  Cardiovascular: Negative for chest pain and palpitations  Gastrointestinal: Positive for abdominal pain  Negative for anorexia, blood in stool, constipation, diarrhea, hematochezia, melena, nausea and vomiting     Genitourinary: Negative for dysuria, flank pain, frequency and hematuria  Musculoskeletal: Negative for joint swelling, neck pain and neck stiffness  Skin: Negative for rash and wound  Neurological: Negative for dizziness, seizures, syncope, facial asymmetry and headaches  Psychiatric/Behavioral: Negative for hallucinations, self-injury and suicidal ideas  All other systems reviewed and are negative  Physical Exam  Physical Exam  Vitals and nursing note reviewed  Constitutional:       General: He is not in acute distress  Appearance: He is well-developed  HENT:      Head: Normocephalic and atraumatic  Right Ear: External ear normal       Left Ear: External ear normal    Eyes:      General: No scleral icterus  Right eye: No discharge  Left eye: No discharge  Extraocular Movements: Extraocular movements intact  Conjunctiva/sclera: Conjunctivae normal    Cardiovascular:      Rate and Rhythm: Normal rate and regular rhythm  Heart sounds: Normal heart sounds  No murmur heard  Pulmonary:      Effort: Pulmonary effort is normal       Breath sounds: Normal breath sounds  No wheezing or rales  Abdominal:      General: Bowel sounds are normal  There is no distension  Palpations: Abdomen is soft  Tenderness: There is abdominal tenderness (Tender in the epigastric region  No guarding or rebound noted  )  There is no guarding or rebound  Musculoskeletal:         General: No deformity  Normal range of motion  Cervical back: Normal range of motion and neck supple  Skin:     General: Skin is warm and dry  Findings: No rash  Neurological:      General: No focal deficit present  Mental Status: He is alert and oriented to person, place, and time  Cranial Nerves: No cranial nerve deficit  Psychiatric:         Mood and Affect: Mood normal          Behavior: Behavior normal          Thought Content:  Thought content normal          Judgment: Judgment normal          Vital Signs  ED Triage Vitals [09/07/21 1726]   Temperature Pulse Respirations Blood Pressure SpO2   97 7 °F (36 5 °C) 95 16 120/86 97 %      Temp Source Heart Rate Source Patient Position - Orthostatic VS BP Location FiO2 (%)   Temporal Monitor Sitting Right arm --      Pain Score       5           Vitals:    09/07/21 1726   BP: 120/86   Pulse: 95   Patient Position - Orthostatic VS: Sitting         Visual Acuity      ED Medications  Medications   sodium chloride 0 9 % bolus 1,000 mL (1,000 mL Intravenous New Bag 9/7/21 1804)   pantoprazole (PROTONIX) injection 40 mg (40 mg Intravenous Given 9/7/21 1802)   aluminum-magnesium hydroxide-simethicone (MYLANTA) oral suspension 30 mL (30 mL Oral Given 9/7/21 1806)   Lidocaine Viscous HCl (XYLOCAINE) 2 % mucosal solution 15 mL (15 mL Swish & Swallow Given 9/7/21 1806)   iohexol (OMNIPAQUE) 350 MG/ML injection (SINGLE-DOSE) 100 mL (100 mL Intravenous Given 9/7/21 1830)       Diagnostic Studies  Results Reviewed     Procedure Component Value Units Date/Time    Troponin I [[id number]]  (Normal) Collected: 09/07/21 1758    Lab Status: Final result Specimen: Blood from Arm, Right Updated: 09/07/21 1828     Troponin I <0 02 ng/mL     Lactic acid [[id number]]  (Normal) Collected: 09/07/21 1758    Lab Status: Final result Specimen: Blood from Arm, Right Updated: 09/07/21 1825     LACTIC ACID 1 8 mmol/L     Narrative:      Result may be elevated if tourniquet was used during collection      Lipase [[id number]]  (Normal) Collected: 09/07/21 1758    Lab Status: Final result Specimen: Blood from Arm, Right Updated: 09/07/21 1822     Lipase 162 u/L     Comprehensive metabolic panel [[id number]]  (Abnormal) Collected: 09/07/21 1758    Lab Status: Final result Specimen: Blood from Arm, Right Updated: 09/07/21 1822     Sodium 136 mmol/L      Potassium 4 3 mmol/L      Chloride 98 mmol/L      CO2 30 mmol/L      ANION GAP 8 mmol/L      BUN 10 mg/dL      Creatinine 0 99 mg/dL      Glucose 184 mg/dL      Calcium 8 7 mg/dL AST 10 U/L      ALT 18 U/L      Alkaline Phosphatase 69 U/L      Total Protein 7 3 g/dL      Albumin 3 7 g/dL      Total Bilirubin 0 41 mg/dL      eGFR 82 ml/min/1 73sq m     Narrative:      [place] guidelines for Chronic Kidney Disease (CKD):     Stage 1 with normal or high GFR (GFR > 90 mL/min/1 73 square meters)    Stage 2 Mild CKD (GFR = 60-89 mL/min/1 73 square meters)    Stage 3A Moderate CKD (GFR = 45-59 mL/min/1 73 square meters)    Stage 3B Moderate CKD (GFR = 30-44 mL/min/1 73 square meters)    Stage 4 Severe CKD (GFR = 15-29 mL/min/1 73 square meters)    Stage 5 End Stage CKD (GFR <15 mL/min/1 73 square meters)  Note: GFR calculation is accurate only with a steady state creatinine    Protime-INR [[id number]]  (Normal) Collected: 09/07/21 1758    Lab Status: Final result Specimen: Blood from Arm, Right Updated: 09/07/21 1817     Protime 14 5 seconds      INR 1 14    APTT [[id number]]  (Normal) Collected: 09/07/21 1758    Lab Status: Final result Specimen: Blood from Arm, Right Updated: 09/07/21 1817     PTT 35 seconds     CBC and differential [[id number]]  (Abnormal) Collected: 09/07/21 1758    Lab Status: Final result Specimen: Blood from Arm, Right Updated: 09/07/21 1806     WBC 10 79 Thousand/uL      RBC 4 47 Million/uL      Hemoglobin 13 4 g/dL      Hematocrit 39 8 %      MCV 89 fL      MCH 30 0 pg      MCHC 33 7 g/dL      RDW 12 5 %      MPV 9 1 fL      Platelets 257 Thousands/uL      nRBC 0 /100 WBCs      Neutrophils Relative 70 %      Immat GRANS % 1 %      Lymphocytes Relative 17 %      Monocytes Relative 11 %      Eosinophils Relative 1 %      Basophils Relative 0 %      Neutrophils Absolute 7 50 Thousands/µL      Immature Grans Absolute 0 12 Thousand/uL      Lymphocytes Absolute 1 87 Thousands/µL      Monocytes Absolute 1 19 Thousand/µL      Eosinophils Absolute 0 07 Thousand/µL      Basophils Absolute 0 04 Thousands/µL     [facility]Covid-19),[facility] [[id number]] Collected: 09/07/21 1758    Lab Status: In process Specimen: Nares from Nose Updated: 09/07/21 1803    UA w Reflex to Microscopic w Reflex to Culture [[id number]]     Lab Status: No result Specimen: Urine                  XR chest 1 view portable   ED Interpretation by Bart Shah MD (09/07 1841)   NAD      CT abdomen pelvis with contrast    (Results Pending)              Procedures  ECG 12 Lead Documentation Only    Date/Time: 9/7/2021 6:15 PM  Performed by: Bart Shah MD  Authorized by: Bart Shah MD     ECG reviewed by me, the ED Provider: yes    Patient location:  ED  Previous ECG:     Previous ECG:  Unavailable  Rate:     ECG rate:  90  Rhythm:     Rhythm: sinus rhythm    Ectopy:     Ectopy: none    QRS:     QRS axis:  Normal             ED Course  ED Course as of Sep 07 1854   Tue Sep 07, 2021   3220 Signed out to Dr Eunice Raymond  MDM      Disposition  Final diagnoses:   Epigastric pain     Time reflects when diagnosis was documented in both MDM as applicable and the Disposition within this note     Time User Action Codes Description Comment    9/7/2021  6:21 PM Shobha Cooper Add [R10 13] Epigastric pain       ED Disposition     ED Disposition Condition Date/Time Comment    Discharge Stable Tue Sep 7, 2021  6:46 PM Phylliss Pipe discharge to home/self care  Follow-up Information     Follow up With Specialties Details Why Harshal Ken DO Internal Medicine Call in 3 days  [address] [place] [address] [facility]  [phone]            Patient's Medications   Discharge Prescriptions    No medications on file     No discharge procedures on file      PDMP Review     None          ED Provider  Electronically Signed by           Bart Shah MD  09/07/21 7138

## 2021-09-07 NOTE — DISCHARGE INSTRUCTIONS
Please take appear prescription refill of Protonix and started taking it  CT abdomen pelvis with contrast   Final Result      Nonspecific fat stranding about the celiac axis #2/27 is a nonspecific finding most frequently associated pancreatic malignancy however a pancreatic mass is not apparent  This may represent the sequelae of chronic splenic vein occlusion in this patient    with prior pancreatitis  Prominent bladder distention possibly indicating outlet obstruction as there is prostamegaly protruding into the bladder base  The study was marked in [facility] for immediate notification              Workstation performed: [id number]         XR chest 1 view portable   ED Interpretation   NAD

## 2021-09-08 LAB
ATRIAL RATE: 91 BPM
P AXIS: 48 DEGREES
PR INTERVAL: 152 MS
QRS AXIS: 56 DEGREES
QRSD INTERVAL: 104 MS
QT INTERVAL: 362 MS
QTC INTERVAL: 445 MS
T WAVE AXIS: 64 DEGREES
VENTRICULAR RATE: 91 BPM

## 2021-11-08 ENCOUNTER — HOSPITAL ENCOUNTER (INPATIENT)
Facility: HOSPITAL | Age: 61
LOS: 1 days | Discharge: LEFT BEFORE DISCHARGE | DRG: 440 | End: 2021-11-09
Attending: EMERGENCY MEDICINE | Admitting: FAMILY MEDICINE
Payer: COMMERCIAL

## 2021-11-08 DIAGNOSIS — I82.90 THROMBUS: ICD-10-CM

## 2021-11-08 DIAGNOSIS — R74.01 ELEVATED LIVER TRANSAMINASE LEVEL: ICD-10-CM

## 2021-11-08 DIAGNOSIS — R17 SERUM TOTAL BILIRUBIN ELEVATED: ICD-10-CM

## 2021-11-08 DIAGNOSIS — K85.90 PANCREATITIS: Primary | ICD-10-CM

## 2021-11-08 LAB
ALBUMIN SERPL BCP-MCNC: 4 G/DL (ref 3.5–5)
ALP SERPL-CCNC: 190 U/L (ref 46–116)
ALT SERPL W P-5'-P-CCNC: 408 U/L (ref 12–78)
ANION GAP SERPL CALCULATED.3IONS-SCNC: 8 MMOL/L (ref 4–13)
AST SERPL W P-5'-P-CCNC: 328 U/L (ref 5–45)
BACTERIA UR QL AUTO: NORMAL /HPF
BASOPHILS # BLD AUTO: 0.04 THOUSANDS/ΜL (ref 0–0.1)
BASOPHILS NFR BLD AUTO: 1 % (ref 0–1)
BILIRUB SERPL-MCNC: 2.39 MG/DL (ref 0.2–1)
BILIRUB UR QL STRIP: ABNORMAL
BUN SERPL-MCNC: 11 MG/DL (ref 5–25)
CALCIUM SERPL-MCNC: 8.8 MG/DL (ref 8.3–10.1)
CHLORIDE SERPL-SCNC: 96 MMOL/L (ref 100–108)
CLARITY UR: CLEAR
CO2 SERPL-SCNC: 29 MMOL/L (ref 21–32)
COLOR UR: YELLOW
CREAT SERPL-MCNC: 1.13 MG/DL (ref 0.6–1.3)
EOSINOPHIL # BLD AUTO: 0.04 THOUSAND/ΜL (ref 0–0.61)
EOSINOPHIL NFR BLD AUTO: 1 % (ref 0–6)
ERYTHROCYTE [DISTWIDTH] IN BLOOD BY AUTOMATED COUNT: 13.1 % (ref 11.6–15.1)
GFR SERPL CREATININE-BSD FRML MDRD: 69 ML/MIN/1.73SQ M
GLUCOSE SERPL-MCNC: 337 MG/DL (ref 65–140)
GLUCOSE UR STRIP-MCNC: ABNORMAL MG/DL
HCT VFR BLD AUTO: 43.6 % (ref 36.5–49.3)
HGB BLD-MCNC: 14.9 G/DL (ref 12–17)
HGB UR QL STRIP.AUTO: NEGATIVE
IMM GRANULOCYTES # BLD AUTO: 0.07 THOUSAND/UL (ref 0–0.2)
IMM GRANULOCYTES NFR BLD AUTO: 1 % (ref 0–2)
KETONES UR STRIP-MCNC: NEGATIVE MG/DL
LEUKOCYTE ESTERASE UR QL STRIP: NEGATIVE
LIPASE SERPL-CCNC: 2555 U/L (ref 73–393)
LYMPHOCYTES # BLD AUTO: 1.48 THOUSANDS/ΜL (ref 0.6–4.47)
LYMPHOCYTES NFR BLD AUTO: 20 % (ref 14–44)
MCH RBC QN AUTO: 29.9 PG (ref 26.8–34.3)
MCHC RBC AUTO-ENTMCNC: 34.2 G/DL (ref 31.4–37.4)
MCV RBC AUTO: 88 FL (ref 82–98)
MONOCYTES # BLD AUTO: 0.78 THOUSAND/ΜL (ref 0.17–1.22)
MONOCYTES NFR BLD AUTO: 11 % (ref 4–12)
NEUTROPHILS # BLD AUTO: 4.99 THOUSANDS/ΜL (ref 1.85–7.62)
NEUTS SEG NFR BLD AUTO: 66 % (ref 43–75)
NITRITE UR QL STRIP: NEGATIVE
NON-SQ EPI CELLS URNS QL MICRO: NORMAL /HPF
NRBC BLD AUTO-RTO: 0 /100 WBCS
PH UR STRIP.AUTO: 7 [PH]
PLATELET # BLD AUTO: 211 THOUSANDS/UL (ref 149–390)
PMV BLD AUTO: 9.3 FL (ref 8.9–12.7)
POTASSIUM SERPL-SCNC: 3.8 MMOL/L (ref 3.5–5.3)
PROT SERPL-MCNC: 7.6 G/DL (ref 6.4–8.2)
PROT UR STRIP-MCNC: NEGATIVE MG/DL
RBC # BLD AUTO: 4.98 MILLION/UL (ref 3.88–5.62)
RBC #/AREA URNS AUTO: NORMAL /HPF
SODIUM SERPL-SCNC: 133 MMOL/L (ref 136–145)
SP GR UR STRIP.AUTO: 1.01 (ref 1–1.03)
UROBILINOGEN UR QL STRIP.AUTO: 2 E.U./DL
WBC # BLD AUTO: 7.4 THOUSAND/UL (ref 4.31–10.16)
WBC #/AREA URNS AUTO: NORMAL /HPF

## 2021-11-08 PROCEDURE — 96361 HYDRATE IV INFUSION ADD-ON: CPT

## 2021-11-08 PROCEDURE — 99285 EMERGENCY DEPT VISIT HI MDM: CPT

## 2021-11-08 PROCEDURE — 83605 ASSAY OF LACTIC ACID: CPT | Performed by: EMERGENCY MEDICINE

## 2021-11-08 PROCEDURE — 96374 THER/PROPH/DIAG INJ IV PUSH: CPT

## 2021-11-08 PROCEDURE — 81001 URINALYSIS AUTO W/SCOPE: CPT | Performed by: EMERGENCY MEDICINE

## 2021-11-08 PROCEDURE — 83690 ASSAY OF LIPASE: CPT | Performed by: EMERGENCY MEDICINE

## 2021-11-08 PROCEDURE — 80053 COMPREHEN METABOLIC PANEL: CPT | Performed by: EMERGENCY MEDICINE

## 2021-11-08 PROCEDURE — 36415 COLL VENOUS BLD VENIPUNCTURE: CPT | Performed by: EMERGENCY MEDICINE

## 2021-11-08 PROCEDURE — 85025 COMPLETE CBC W/AUTO DIFF WBC: CPT | Performed by: EMERGENCY MEDICINE

## 2021-11-08 PROCEDURE — 99285 EMERGENCY DEPT VISIT HI MDM: CPT | Performed by: EMERGENCY MEDICINE

## 2021-11-08 PROCEDURE — 84484 ASSAY OF TROPONIN QUANT: CPT | Performed by: EMERGENCY MEDICINE

## 2021-11-08 RX ORDER — LIDOCAINE HYDROCHLORIDE 20 MG/ML
10 SOLUTION OROPHARYNGEAL ONCE
Status: COMPLETED | OUTPATIENT
Start: 2021-11-09 | End: 2021-11-09

## 2021-11-08 RX ORDER — MAGNESIUM HYDROXIDE/ALUMINUM HYDROXICE/SIMETHICONE 120; 1200; 1200 MG/30ML; MG/30ML; MG/30ML
15 SUSPENSION ORAL ONCE
Status: COMPLETED | OUTPATIENT
Start: 2021-11-09 | End: 2021-11-09

## 2021-11-08 RX ORDER — MORPHINE SULFATE 4 MG/ML
4 INJECTION, SOLUTION INTRAMUSCULAR; INTRAVENOUS ONCE
Status: COMPLETED | OUTPATIENT
Start: 2021-11-09 | End: 2021-11-08

## 2021-11-08 RX ADMIN — SODIUM CHLORIDE 1000 ML: 0.9 INJECTION, SOLUTION INTRAVENOUS at 23:58

## 2021-11-08 RX ADMIN — MORPHINE SULFATE 4 MG: 4 INJECTION INTRAVENOUS at 23:59

## 2021-11-09 ENCOUNTER — APPOINTMENT (EMERGENCY)
Dept: RADIOLOGY | Facility: HOSPITAL | Age: 61
DRG: 440 | End: 2021-11-09
Payer: COMMERCIAL

## 2021-11-09 ENCOUNTER — APPOINTMENT (EMERGENCY)
Dept: CT IMAGING | Facility: HOSPITAL | Age: 61
DRG: 440 | End: 2021-11-09
Payer: COMMERCIAL

## 2021-11-09 VITALS
HEART RATE: 85 BPM | DIASTOLIC BLOOD PRESSURE: 78 MMHG | RESPIRATION RATE: 18 BRPM | BODY MASS INDEX: 30.01 KG/M2 | WEIGHT: 198 LBS | SYSTOLIC BLOOD PRESSURE: 117 MMHG | TEMPERATURE: 98.2 F | HEIGHT: 68 IN | OXYGEN SATURATION: 96 %

## 2021-11-09 PROBLEM — N40.0 BPH (BENIGN PROSTATIC HYPERPLASIA): Status: ACTIVE | Noted: 2021-11-09

## 2021-11-09 PROBLEM — E07.9 DISEASE OF THYROID GLAND: Status: ACTIVE | Noted: 2021-11-09

## 2021-11-09 PROBLEM — R74.01 TRANSAMINITIS: Status: ACTIVE | Noted: 2021-11-09

## 2021-11-09 PROBLEM — K86.1 CHRONIC PANCREATITIS (HCC): Status: ACTIVE | Noted: 2021-11-09

## 2021-11-09 PROBLEM — K85.90 ACUTE PANCREATITIS: Status: ACTIVE | Noted: 2021-11-09

## 2021-11-09 PROBLEM — E11.9 DIABETES MELLITUS (HCC): Status: ACTIVE | Noted: 2021-11-09

## 2021-11-09 PROBLEM — I10 HYPERTENSION: Status: ACTIVE | Noted: 2021-11-09

## 2021-11-09 PROBLEM — E78.00 HIGH CHOLESTEROL: Status: ACTIVE | Noted: 2021-11-09

## 2021-11-09 PROBLEM — K21.9 GERD (GASTROESOPHAGEAL REFLUX DISEASE): Status: ACTIVE | Noted: 2021-11-09

## 2021-11-09 LAB
2HR DELTA HS TROPONIN: 1 NG/L
ALBUMIN SERPL BCP-MCNC: 3.3 G/DL (ref 3.5–5)
ALP SERPL-CCNC: 154 U/L (ref 46–116)
ALT SERPL W P-5'-P-CCNC: 297 U/L (ref 12–78)
ANION GAP SERPL CALCULATED.3IONS-SCNC: 7 MMOL/L (ref 4–13)
AST SERPL W P-5'-P-CCNC: 138 U/L (ref 5–45)
BASOPHILS # BLD AUTO: 0.05 THOUSANDS/ΜL (ref 0–0.1)
BASOPHILS NFR BLD AUTO: 1 % (ref 0–1)
BILIRUB SERPL-MCNC: 0.69 MG/DL (ref 0.2–1)
BUN SERPL-MCNC: 11 MG/DL (ref 5–25)
CALCIUM ALBUM COR SERPL-MCNC: 8 MG/DL (ref 8.3–10.1)
CALCIUM SERPL-MCNC: 7.4 MG/DL (ref 8.3–10.1)
CARDIAC TROPONIN I PNL SERPL HS: 2 NG/L
CARDIAC TROPONIN I PNL SERPL HS: 3 NG/L
CHLORIDE SERPL-SCNC: 103 MMOL/L (ref 100–108)
CO2 SERPL-SCNC: 25 MMOL/L (ref 21–32)
CREAT SERPL-MCNC: 0.95 MG/DL (ref 0.6–1.3)
EOSINOPHIL # BLD AUTO: 0.12 THOUSAND/ΜL (ref 0–0.61)
EOSINOPHIL NFR BLD AUTO: 2 % (ref 0–6)
ERYTHROCYTE [DISTWIDTH] IN BLOOD BY AUTOMATED COUNT: 13.5 % (ref 11.6–15.1)
GFR SERPL CREATININE-BSD FRML MDRD: 85 ML/MIN/1.73SQ M
GLUCOSE SERPL-MCNC: 169 MG/DL (ref 65–140)
GLUCOSE SERPL-MCNC: 202 MG/DL (ref 65–140)
GLUCOSE SERPL-MCNC: 250 MG/DL (ref 65–140)
GLUCOSE SERPL-MCNC: 257 MG/DL (ref 65–140)
GLUCOSE SERPL-MCNC: 299 MG/DL (ref 65–140)
HCT VFR BLD AUTO: 39.1 % (ref 36.5–49.3)
HGB BLD-MCNC: 12.8 G/DL (ref 12–17)
IMM GRANULOCYTES # BLD AUTO: 0.05 THOUSAND/UL (ref 0–0.2)
IMM GRANULOCYTES NFR BLD AUTO: 1 % (ref 0–2)
LACTATE SERPL-SCNC: 1.3 MMOL/L (ref 0.5–2)
LYMPHOCYTES # BLD AUTO: 1.88 THOUSANDS/ΜL (ref 0.6–4.47)
LYMPHOCYTES NFR BLD AUTO: 27 % (ref 14–44)
MAGNESIUM SERPL-MCNC: 2.4 MG/DL (ref 1.6–2.6)
MCH RBC QN AUTO: 29.8 PG (ref 26.8–34.3)
MCHC RBC AUTO-ENTMCNC: 32.7 G/DL (ref 31.4–37.4)
MCV RBC AUTO: 91 FL (ref 82–98)
MONOCYTES # BLD AUTO: 0.66 THOUSAND/ΜL (ref 0.17–1.22)
MONOCYTES NFR BLD AUTO: 9 % (ref 4–12)
NEUTROPHILS # BLD AUTO: 4.33 THOUSANDS/ΜL (ref 1.85–7.62)
NEUTS SEG NFR BLD AUTO: 60 % (ref 43–75)
NRBC BLD AUTO-RTO: 0 /100 WBCS
PHOSPHATE SERPL-MCNC: 2.6 MG/DL (ref 2.3–4.1)
PLATELET # BLD AUTO: 183 THOUSANDS/UL (ref 149–390)
PMV BLD AUTO: 9.2 FL (ref 8.9–12.7)
POTASSIUM SERPL-SCNC: 3.9 MMOL/L (ref 3.5–5.3)
PROT SERPL-MCNC: 6.6 G/DL (ref 6.4–8.2)
RBC # BLD AUTO: 4.29 MILLION/UL (ref 3.88–5.62)
SODIUM SERPL-SCNC: 135 MMOL/L (ref 136–145)
WBC # BLD AUTO: 7.09 THOUSAND/UL (ref 4.31–10.16)

## 2021-11-09 PROCEDURE — 85025 COMPLETE CBC W/AUTO DIFF WBC: CPT | Performed by: NURSE PRACTITIONER

## 2021-11-09 PROCEDURE — 71045 X-RAY EXAM CHEST 1 VIEW: CPT

## 2021-11-09 PROCEDURE — 93005 ELECTROCARDIOGRAM TRACING: CPT

## 2021-11-09 PROCEDURE — G1004 CDSM NDSC: HCPCS

## 2021-11-09 PROCEDURE — 83735 ASSAY OF MAGNESIUM: CPT | Performed by: NURSE PRACTITIONER

## 2021-11-09 PROCEDURE — 84484 ASSAY OF TROPONIN QUANT: CPT | Performed by: EMERGENCY MEDICINE

## 2021-11-09 PROCEDURE — 96374 THER/PROPH/DIAG INJ IV PUSH: CPT

## 2021-11-09 PROCEDURE — 99236 HOSP IP/OBS SAME DATE HI 85: CPT | Performed by: STUDENT IN AN ORGANIZED HEALTH CARE EDUCATION/TRAINING PROGRAM

## 2021-11-09 PROCEDURE — 82948 REAGENT STRIP/BLOOD GLUCOSE: CPT

## 2021-11-09 PROCEDURE — 80053 COMPREHEN METABOLIC PANEL: CPT | Performed by: NURSE PRACTITIONER

## 2021-11-09 PROCEDURE — 84100 ASSAY OF PHOSPHORUS: CPT | Performed by: NURSE PRACTITIONER

## 2021-11-09 PROCEDURE — 36415 COLL VENOUS BLD VENIPUNCTURE: CPT | Performed by: EMERGENCY MEDICINE

## 2021-11-09 PROCEDURE — 74177 CT ABD & PELVIS W/CONTRAST: CPT

## 2021-11-09 PROCEDURE — 96361 HYDRATE IV INFUSION ADD-ON: CPT

## 2021-11-09 RX ORDER — LEVOTHYROXINE SODIUM 0.05 MG/1
50 TABLET ORAL
Status: DISCONTINUED | OUTPATIENT
Start: 2021-11-09 | End: 2021-11-09 | Stop reason: HOSPADM

## 2021-11-09 RX ORDER — HYDROMORPHONE HCL/PF 1 MG/ML
0.5 SYRINGE (ML) INJECTION
Status: DISCONTINUED | OUTPATIENT
Start: 2021-11-09 | End: 2021-11-09 | Stop reason: HOSPADM

## 2021-11-09 RX ORDER — INSULIN GLARGINE 100 [IU]/ML
25 INJECTION, SOLUTION SUBCUTANEOUS
COMMUNITY

## 2021-11-09 RX ORDER — PRAVASTATIN SODIUM 80 MG/1
80 TABLET ORAL
Status: DISCONTINUED | OUTPATIENT
Start: 2021-11-09 | End: 2021-11-09

## 2021-11-09 RX ORDER — SEMAGLUTIDE 1.34 MG/ML
INJECTION, SOLUTION SUBCUTANEOUS
COMMUNITY
End: 2021-11-09 | Stop reason: HOSPADM

## 2021-11-09 RX ORDER — ACETAMINOPHEN 325 MG/1
650 TABLET ORAL EVERY 6 HOURS PRN
Status: DISCONTINUED | OUTPATIENT
Start: 2021-11-09 | End: 2021-11-09 | Stop reason: HOSPADM

## 2021-11-09 RX ORDER — SODIUM CHLORIDE, SODIUM LACTATE, POTASSIUM CHLORIDE, CALCIUM CHLORIDE 600; 310; 30; 20 MG/100ML; MG/100ML; MG/100ML; MG/100ML
200 INJECTION, SOLUTION INTRAVENOUS CONTINUOUS
Status: DISPENSED | OUTPATIENT
Start: 2021-11-09 | End: 2021-11-09

## 2021-11-09 RX ORDER — POLYETHYLENE GLYCOL 3350 17 G/17G
17 POWDER, FOR SOLUTION ORAL DAILY PRN
Status: DISCONTINUED | OUTPATIENT
Start: 2021-11-09 | End: 2021-11-09 | Stop reason: HOSPADM

## 2021-11-09 RX ORDER — MORPHINE SULFATE 4 MG/ML
4 INJECTION, SOLUTION INTRAMUSCULAR; INTRAVENOUS ONCE
Status: DISCONTINUED | OUTPATIENT
Start: 2021-11-09 | End: 2021-11-09

## 2021-11-09 RX ORDER — LORATADINE 10 MG/1
10 TABLET ORAL DAILY
Status: DISCONTINUED | OUTPATIENT
Start: 2021-11-09 | End: 2021-11-09 | Stop reason: HOSPADM

## 2021-11-09 RX ORDER — MORPHINE SULFATE 4 MG/ML
4 INJECTION, SOLUTION INTRAMUSCULAR; INTRAVENOUS ONCE
Status: COMPLETED | OUTPATIENT
Start: 2021-11-09 | End: 2021-11-09

## 2021-11-09 RX ORDER — HYDROMORPHONE HCL IN WATER/PF 6 MG/30 ML
0.2 PATIENT CONTROLLED ANALGESIA SYRINGE INTRAVENOUS
Status: DISCONTINUED | OUTPATIENT
Start: 2021-11-09 | End: 2021-11-09 | Stop reason: HOSPADM

## 2021-11-09 RX ORDER — PANTOPRAZOLE SODIUM 40 MG/1
40 TABLET, DELAYED RELEASE ORAL
Status: DISCONTINUED | OUTPATIENT
Start: 2021-11-09 | End: 2021-11-09 | Stop reason: HOSPADM

## 2021-11-09 RX ORDER — LISINOPRIL 20 MG/1
20 TABLET ORAL DAILY
Status: DISCONTINUED | OUTPATIENT
Start: 2021-11-09 | End: 2021-11-09 | Stop reason: HOSPADM

## 2021-11-09 RX ORDER — ONDANSETRON 2 MG/ML
4 INJECTION INTRAMUSCULAR; INTRAVENOUS EVERY 6 HOURS PRN
Status: DISCONTINUED | OUTPATIENT
Start: 2021-11-09 | End: 2021-11-09 | Stop reason: HOSPADM

## 2021-11-09 RX ORDER — TAMSULOSIN HYDROCHLORIDE 0.4 MG/1
0.4 CAPSULE ORAL
Status: DISCONTINUED | OUTPATIENT
Start: 2021-11-09 | End: 2021-11-09 | Stop reason: HOSPADM

## 2021-11-09 RX ORDER — VENLAFAXINE HYDROCHLORIDE 150 MG/1
150 CAPSULE, EXTENDED RELEASE ORAL DAILY
Status: DISCONTINUED | OUTPATIENT
Start: 2021-11-09 | End: 2021-11-09 | Stop reason: HOSPADM

## 2021-11-09 RX ADMIN — INSULIN LISPRO 2 UNITS: 100 INJECTION, SOLUTION INTRAVENOUS; SUBCUTANEOUS at 17:39

## 2021-11-09 RX ADMIN — TAMSULOSIN HYDROCHLORIDE 0.4 MG: 0.4 CAPSULE ORAL at 17:39

## 2021-11-09 RX ADMIN — ALUMINUM HYDROXIDE, MAGNESIUM HYDROXIDE, AND SIMETHICONE 15 ML: 200; 200; 20 SUSPENSION ORAL at 00:07

## 2021-11-09 RX ADMIN — INSULIN LISPRO 1 UNITS: 100 INJECTION, SOLUTION INTRAVENOUS; SUBCUTANEOUS at 12:05

## 2021-11-09 RX ADMIN — APIXABAN 5 MG: 5 TABLET, FILM COATED ORAL at 17:39

## 2021-11-09 RX ADMIN — INSULIN LISPRO 4 UNITS: 100 INJECTION, SOLUTION INTRAVENOUS; SUBCUTANEOUS at 02:25

## 2021-11-09 RX ADMIN — MORPHINE SULFATE 4 MG: 4 INJECTION INTRAVENOUS at 02:24

## 2021-11-09 RX ADMIN — LIDOCAINE HYDROCHLORIDE 10 ML: 20 SOLUTION ORAL; TOPICAL at 00:07

## 2021-11-09 RX ADMIN — PANTOPRAZOLE SODIUM 40 MG: 40 TABLET, DELAYED RELEASE ORAL at 05:25

## 2021-11-09 RX ADMIN — LORATADINE 10 MG: 10 TABLET ORAL at 09:05

## 2021-11-09 RX ADMIN — VENLAFAXINE HYDROCHLORIDE 150 MG: 150 CAPSULE, EXTENDED RELEASE ORAL at 09:05

## 2021-11-09 RX ADMIN — IOHEXOL 100 ML: 350 INJECTION, SOLUTION INTRAVENOUS at 00:32

## 2021-11-09 RX ADMIN — FAMOTIDINE 20 MG: 10 INJECTION, SOLUTION INTRAVENOUS at 00:25

## 2021-11-09 RX ADMIN — LEVOTHYROXINE SODIUM 50 MCG: 50 TABLET ORAL at 05:25

## 2021-11-09 RX ADMIN — APIXABAN 5 MG: 5 TABLET, FILM COATED ORAL at 09:05

## 2021-11-09 RX ADMIN — SODIUM CHLORIDE, SODIUM LACTATE, POTASSIUM CHLORIDE, AND CALCIUM CHLORIDE 200 ML/HR: .6; .31; .03; .02 INJECTION, SOLUTION INTRAVENOUS at 11:06

## 2021-11-09 RX ADMIN — HYDROMORPHONE HYDROCHLORIDE 0.5 MG: 1 INJECTION, SOLUTION INTRAMUSCULAR; INTRAVENOUS; SUBCUTANEOUS at 05:24

## 2021-11-09 RX ADMIN — SODIUM CHLORIDE 1000 ML: 0.9 INJECTION, SOLUTION INTRAVENOUS at 01:33

## 2021-11-09 RX ADMIN — LISINOPRIL 20 MG: 20 TABLET ORAL at 09:05

## 2021-11-09 RX ADMIN — INSULIN LISPRO 3 UNITS: 100 INJECTION, SOLUTION INTRAVENOUS; SUBCUTANEOUS at 06:34

## 2021-11-09 RX ADMIN — SODIUM CHLORIDE, SODIUM LACTATE, POTASSIUM CHLORIDE, AND CALCIUM CHLORIDE 200 ML/HR: .6; .31; .03; .02 INJECTION, SOLUTION INTRAVENOUS at 02:25

## 2021-11-14 LAB
ATRIAL RATE: 88 BPM
P AXIS: 44 DEGREES
PR INTERVAL: 164 MS
QRS AXIS: 44 DEGREES
QRSD INTERVAL: 104 MS
QT INTERVAL: 368 MS
QTC INTERVAL: 445 MS
T WAVE AXIS: 67 DEGREES
VENTRICULAR RATE: 88 BPM

## 2021-11-14 PROCEDURE — 93010 ELECTROCARDIOGRAM REPORT: CPT | Performed by: INTERNAL MEDICINE

## 2021-12-06 ENCOUNTER — DOCTOR'S OFFICE (OUTPATIENT)
Dept: URBAN - NONMETROPOLITAN AREA CLINIC 1 | Facility: CLINIC | Age: 61
Setting detail: OPHTHALMOLOGY
End: 2021-12-06
Payer: COMMERCIAL

## 2021-12-06 DIAGNOSIS — H04.123: ICD-10-CM

## 2021-12-06 DIAGNOSIS — H40.013: ICD-10-CM

## 2021-12-06 PROCEDURE — 92083 EXTENDED VISUAL FIELD XM: CPT | Performed by: OPHTHALMOLOGY

## 2021-12-06 PROCEDURE — 92133 CPTRZD OPH DX IMG PST SGM ON: CPT | Performed by: OPHTHALMOLOGY

## 2021-12-06 PROCEDURE — 76514 ECHO EXAM OF EYE THICKNESS: CPT | Performed by: OPHTHALMOLOGY

## 2021-12-06 PROCEDURE — 92020 GONIOSCOPY: CPT | Performed by: OPHTHALMOLOGY

## 2021-12-06 PROCEDURE — 99214 OFFICE O/P EST MOD 30 MIN: CPT | Performed by: OPHTHALMOLOGY

## 2021-12-06 ASSESSMENT — REFRACTION_AUTOREFRACTION
OS_CYLINDER: -1.00
OS_SPHERE: +1.25
OD_CYLINDER: -1.00
OD_AXIS: 104
OD_SPHERE: +1.00
OS_AXIS: 083

## 2021-12-06 ASSESSMENT — REFRACTION_MANIFEST
OD_SPHERE: +1.25
OD_ADD: +2.50
OD_VA1: 20/25-2
OS_ADD: +2.50
OD_CYLINDER: -0.75
OD_AXIS: 105
OS_VA1: 20/20-1
OS_SPHERE: +1.25
OS_VA2: 20/20-1
OD_VA2: 20/20-1
OS_CYLINDER: -0.50
OS_AXIS: 075

## 2021-12-06 ASSESSMENT — PACHYMETRY
OS_CT_UM: 530
OD_CT_CORRECTION: 1
OD_CT_UM: 538
OS_CT_CORRECTION: 1

## 2021-12-06 ASSESSMENT — REFRACTION_CURRENTRX
OS_VPRISM_DIRECTION: BF
OS_OVR_VA: 20/
OD_ADD: +2.50
OD_SPHERE: +1.25
OD_OVR_VA: 20/
OD_CYLINDER: -0.75
OD_AXIS: 106
OD_VPRISM_DIRECTION: BF
OS_ADD: +2.75
OS_CYLINDER: -0.50
OS_SPHERE: +1.25
OS_AXIS: 077

## 2021-12-06 ASSESSMENT — LID EXAM ASSESSMENTS
OD_COMMENTS: 1+ MGD
OS_BLEPHARITIS: LLL LUL T
OD_BLEPHARITIS: RLL RUL T
OS_COMMENTS: 1+ MGD

## 2021-12-06 ASSESSMENT — KERATOMETRY
OD_K2POWER_DIOPTERS: 44.00
OS_AXISANGLE_DEGREES: 105
OS_K1POWER_DIOPTERS: 43.00
OD_AXISANGLE_DEGREES: 086
OD_K1POWER_DIOPTERS: 43.25
OS_K2POWER_DIOPTERS: 43.50

## 2021-12-06 ASSESSMENT — DRY EYES - PHYSICIAN NOTES
OS_GENERALCOMMENTS: 2+ PEE
OD_GENERALCOMMENTS: 2+ PEE

## 2021-12-06 ASSESSMENT — SPHEQUIV_DERIVED
OS_SPHEQUIV: 0.75
OD_SPHEQUIV: 0.875
OS_SPHEQUIV: 1
OD_SPHEQUIV: 0.5

## 2021-12-06 ASSESSMENT — TONOMETRY
OS_IOP_MMHG: 16
OD_IOP_MMHG: 15

## 2021-12-06 ASSESSMENT — AXIALLENGTH_DERIVED
OD_AL: 23.3536
OS_AL: 23.3932
OD_AL: 23.2113
OS_AL: 23.2978

## 2021-12-06 ASSESSMENT — PUNCTA - ASSESSMENT
OS_PUNCTA: LLL MED
OD_PUNCTA: RLL MED

## 2021-12-06 ASSESSMENT — CONFRONTATIONAL VISUAL FIELD TEST (CVF)
OD_FINDINGS: FULL
OS_FINDINGS: FULL

## 2021-12-06 ASSESSMENT — VISUAL ACUITY
OD_BCVA: 20/20-2
OS_BCVA: 20/30+2

## 2022-05-31 ENCOUNTER — DOCTOR'S OFFICE (OUTPATIENT)
Dept: URBAN - NONMETROPOLITAN AREA CLINIC 1 | Facility: CLINIC | Age: 62
Setting detail: OPHTHALMOLOGY
End: 2022-05-31
Payer: COMMERCIAL

## 2022-05-31 DIAGNOSIS — H10.13: ICD-10-CM

## 2022-05-31 PROCEDURE — 99213 OFFICE O/P EST LOW 20 MIN: CPT | Performed by: OPHTHALMOLOGY

## 2022-05-31 ASSESSMENT — PUNCTA - ASSESSMENT
OD_PUNCTA: RLL MED
OS_PUNCTA: LLL MED

## 2022-05-31 ASSESSMENT — REFRACTION_CURRENTRX
OS_VPRISM_DIRECTION: BF
OS_CYLINDER: -0.50
OS_SPHERE: +1.25
OS_ADD: +2.75
OD_OVR_VA: 20/
OD_AXIS: 106
OD_VPRISM_DIRECTION: BF
OD_ADD: +2.50
OD_CYLINDER: -0.75
OS_OVR_VA: 20/
OS_AXIS: 077
OD_SPHERE: +1.25

## 2022-05-31 ASSESSMENT — DRY EYES - PHYSICIAN NOTES
OD_GENERALCOMMENTS: 2+ PEE
OS_GENERALCOMMENTS: 2+ PEE

## 2022-05-31 ASSESSMENT — AXIALLENGTH_DERIVED
OD_AL: 23.2559
OS_AL: 23.5835
OS_AL: 23.3427
OD_AL: 23.3509

## 2022-05-31 ASSESSMENT — SPHEQUIV_DERIVED
OS_SPHEQUIV: 0.375
OD_SPHEQUIV: 0.625
OS_SPHEQUIV: 1
OD_SPHEQUIV: 0.875

## 2022-05-31 ASSESSMENT — LID EXAM ASSESSMENTS
OS_COMMENTS: 1+ MGD
OD_COMMENTS: 1+ MGD
OD_BLEPHARITIS: RLL RUL T
OS_BLEPHARITIS: LLL LUL T

## 2022-05-31 ASSESSMENT — REFRACTION_MANIFEST
OS_ADD: +2.50
OS_AXIS: 075
OD_ADD: +2.50
OS_VA2: 20/20-1
OD_VA2: 20/20-1
OD_CYLINDER: -0.75
OD_SPHERE: +1.25
OD_AXIS: 105
OS_SPHERE: +1.25
OS_CYLINDER: -0.50
OD_VA1: 20/25-2
OS_VA1: 20/20-1

## 2022-05-31 ASSESSMENT — KERATOMETRY
OD_AXISANGLE_DEGREES: 90
OS_AXISANGLE_DEGREES: 106
OS_K1POWER_DIOPTERS: 42.75
OD_K1POWER_DIOPTERS: 43.00
OD_K2POWER_DIOPTERS: 44.00
OS_K2POWER_DIOPTERS: 43.50

## 2022-05-31 ASSESSMENT — REFRACTION_AUTOREFRACTION
OD_CYLINDER: -1.25
OD_SPHERE: +1.25
OS_AXIS: 13
OS_CYLINDER: -0.25
OS_SPHERE: +0.50
OD_AXIS: 108

## 2022-05-31 ASSESSMENT — TONOMETRY
OD_IOP_MMHG: 16
OS_IOP_MMHG: 17

## 2022-05-31 ASSESSMENT — CONFRONTATIONAL VISUAL FIELD TEST (CVF)
OD_FINDINGS: FULL
OS_FINDINGS: FULL

## 2022-05-31 ASSESSMENT — PACHYMETRY
OD_CT_CORRECTION: 1
OS_CT_UM: 530
OD_CT_UM: 538
OS_CT_CORRECTION: 1

## 2022-05-31 ASSESSMENT — VISUAL ACUITY
OD_BCVA: 20/25
OS_BCVA: 20/25-1

## 2022-06-17 ENCOUNTER — DOCTOR'S OFFICE (OUTPATIENT)
Dept: URBAN - NONMETROPOLITAN AREA CLINIC 1 | Facility: CLINIC | Age: 62
Setting detail: OPHTHALMOLOGY
End: 2022-06-17
Payer: COMMERCIAL

## 2022-06-17 DIAGNOSIS — H10.13: ICD-10-CM

## 2022-06-17 PROCEDURE — 99213 OFFICE O/P EST LOW 20 MIN: CPT | Performed by: OPHTHALMOLOGY

## 2022-06-17 ASSESSMENT — REFRACTION_AUTOREFRACTION
OS_SPHERE: +1.25
OD_CYLINDER: -1.50
OS_AXIS: 015
OD_SPHERE: +1.75
OS_CYLINDER: -0.25
OD_AXIS: 116

## 2022-06-17 ASSESSMENT — REFRACTION_CURRENTRX
OS_SPHERE: +1.25
OD_ADD: +2.50
OD_VPRISM_DIRECTION: BF
OS_OVR_VA: 20/
OS_VPRISM_DIRECTION: BF
OD_CYLINDER: -0.75
OD_SPHERE: +1.25
OD_AXIS: 106
OS_AXIS: 077
OS_CYLINDER: -0.50
OS_ADD: +2.75
OD_OVR_VA: 20/

## 2022-06-17 ASSESSMENT — SPHEQUIV_DERIVED
OS_SPHEQUIV: 1.125
OD_SPHEQUIV: 0.875
OS_SPHEQUIV: 1
OD_SPHEQUIV: 1

## 2022-06-17 ASSESSMENT — VISUAL ACUITY
OD_BCVA: 20/25-1
OS_BCVA: 20/25-1

## 2022-06-17 ASSESSMENT — PUNCTA - ASSESSMENT
OD_PUNCTA: RLL MED
OS_PUNCTA: LLL MED

## 2022-06-17 ASSESSMENT — REFRACTION_MANIFEST
OS_ADD: +2.50
OS_AXIS: 075
OD_CYLINDER: -0.75
OD_VA2: 20/20-1
OD_SPHERE: +1.25
OD_ADD: +2.50
OS_VA1: 20/20-1
OS_SPHERE: +1.25
OD_VA1: 20/25-2
OD_AXIS: 105
OS_CYLINDER: -0.50
OS_VA2: 20/20-1

## 2022-06-17 ASSESSMENT — KERATOMETRY
OD_K1POWER_DIOPTERS: 44.00
OD_AXISANGLE_DEGREES: 091
OS_AXISANGLE_DEGREES: 091
OS_K1POWER_DIOPTERS: 42.75
OS_K2POWER_DIOPTERS: 43.75
OD_K2POWER_DIOPTERS: 45.25

## 2022-06-17 ASSESSMENT — PACHYMETRY
OD_CT_CORRECTION: 1
OD_CT_UM: 538
OS_CT_UM: 530
OS_CT_CORRECTION: 1

## 2022-06-17 ASSESSMENT — LID EXAM ASSESSMENTS
OS_COMMENTS: 1+ MGD
OD_COMMENTS: 1+ MGD
OD_BLEPHARITIS: RLL RUL T
OS_BLEPHARITIS: LLL LUL T

## 2022-06-17 ASSESSMENT — TONOMETRY
OS_IOP_MMHG: 17
OD_IOP_MMHG: 15

## 2022-06-17 ASSESSMENT — AXIALLENGTH_DERIVED
OD_AL: 22.8611
OS_AL: 23.2978
OS_AL: 23.2504
OD_AL: 22.8155

## 2022-06-17 ASSESSMENT — CONFRONTATIONAL VISUAL FIELD TEST (CVF)
OD_FINDINGS: FULL
OS_FINDINGS: FULL

## 2022-07-01 ENCOUNTER — DOCTOR'S OFFICE (OUTPATIENT)
Dept: URBAN - NONMETROPOLITAN AREA CLINIC 1 | Facility: CLINIC | Age: 62
Setting detail: OPHTHALMOLOGY
End: 2022-07-01
Payer: COMMERCIAL

## 2022-07-01 ENCOUNTER — RX ONLY (RX ONLY)
Age: 62
End: 2022-07-01

## 2022-07-01 DIAGNOSIS — H10.13: ICD-10-CM

## 2022-07-01 PROCEDURE — 99213 OFFICE O/P EST LOW 20 MIN: CPT | Performed by: OPHTHALMOLOGY

## 2022-07-01 ASSESSMENT — PACHYMETRY
OS_CT_UM: 530
OS_CT_CORRECTION: 1
OD_CT_CORRECTION: 1
OD_CT_UM: 538

## 2022-07-01 ASSESSMENT — REFRACTION_MANIFEST
OD_ADD: +2.50
OS_ADD: +2.50
OD_SPHERE: +1.25
OS_SPHERE: +1.25
OS_CYLINDER: -0.50
OS_AXIS: 075
OD_VA2: 20/20-1
OD_CYLINDER: -0.75
OD_AXIS: 105
OD_VA1: 20/25-2
OS_VA2: 20/20-1
OS_VA1: 20/20-1

## 2022-07-01 ASSESSMENT — AXIALLENGTH_DERIVED
OD_AL: 23.3006
OD_AL: 23.5405
OS_AL: 23.4783
OS_AL: 23.6239

## 2022-07-01 ASSESSMENT — REFRACTION_CURRENTRX
OD_OVR_VA: 20/
OS_SPHERE: +1.25
OS_AXIS: 077
OS_VPRISM_DIRECTION: BF
OD_AXIS: 106
OD_SPHERE: +1.25
OS_OVR_VA: 20/
OS_CYLINDER: -0.50
OS_ADD: +2.75
OD_VPRISM_DIRECTION: BF
OD_ADD: +2.50
OD_CYLINDER: -0.75

## 2022-07-01 ASSESSMENT — REFRACTION_AUTOREFRACTION
OD_SPHERE: +0.75
OS_CYLINDER: -0.25
OS_SPHERE: +0.75
OD_CYLINDER: -1.00
OS_AXIS: 092
OD_AXIS: 105

## 2022-07-01 ASSESSMENT — LID EXAM ASSESSMENTS
OD_BLEPHARITIS: RLL RUL T
OS_BLEPHARITIS: LLL LUL T
OD_COMMENTS: 1+ MGD
OS_COMMENTS: 1+ MGD

## 2022-07-01 ASSESSMENT — SPHEQUIV_DERIVED
OD_SPHEQUIV: 0.25
OD_SPHEQUIV: 0.875
OS_SPHEQUIV: 0.625
OS_SPHEQUIV: 1

## 2022-07-01 ASSESSMENT — PUNCTA - ASSESSMENT
OD_PUNCTA: RLL MED
OS_PUNCTA: LLL MED

## 2022-07-01 ASSESSMENT — CONFRONTATIONAL VISUAL FIELD TEST (CVF)
OS_FINDINGS: FULL
OD_FINDINGS: FULL

## 2022-07-01 ASSESSMENT — KERATOMETRY
OD_K1POWER_DIOPTERS: 43.00
OD_K2POWER_DIOPTERS: 43.75
OS_K2POWER_DIOPTERS: 43.00
OS_AXISANGLE_DEGREES: 089
OS_K1POWER_DIOPTERS: 42.50
OD_AXISANGLE_DEGREES: 085

## 2022-07-01 ASSESSMENT — VISUAL ACUITY
OD_BCVA: 20/25-1
OS_BCVA: 20/25-1

## 2022-07-01 ASSESSMENT — TONOMETRY: OD_IOP_MMHG: 14

## 2022-07-05 ENCOUNTER — DOCTOR'S OFFICE (OUTPATIENT)
Dept: URBAN - NONMETROPOLITAN AREA CLINIC 1 | Facility: CLINIC | Age: 62
Setting detail: OPHTHALMOLOGY
End: 2022-07-05
Payer: COMMERCIAL

## 2022-07-05 ENCOUNTER — RX ONLY (RX ONLY)
Age: 62
End: 2022-07-05

## 2022-07-05 DIAGNOSIS — H10.13: ICD-10-CM

## 2022-07-05 DIAGNOSIS — Z79.84: ICD-10-CM

## 2022-07-05 DIAGNOSIS — H40.013: ICD-10-CM

## 2022-07-05 DIAGNOSIS — E11.9: ICD-10-CM

## 2022-07-05 PROCEDURE — 99213 OFFICE O/P EST LOW 20 MIN: CPT | Performed by: OPHTHALMOLOGY

## 2022-07-05 ASSESSMENT — AXIALLENGTH_DERIVED
OS_AL: 23.3877
OD_AL: 23.3454
OS_AL: 23.34
OD_AL: 23.4894

## 2022-07-05 ASSESSMENT — KERATOMETRY
OD_AXISANGLE_DEGREES: 096
OD_K1POWER_DIOPTERS: 43.00
OS_K2POWER_DIOPTERS: 43.25
OD_K2POWER_DIOPTERS: 43.50
OS_K1POWER_DIOPTERS: 42.75
OS_AXISANGLE_DEGREES: 094

## 2022-07-05 ASSESSMENT — SPHEQUIV_DERIVED
OS_SPHEQUIV: 1.125
OD_SPHEQUIV: 0.875
OS_SPHEQUIV: 1
OD_SPHEQUIV: 0.5

## 2022-07-05 ASSESSMENT — REFRACTION_AUTOREFRACTION
OS_CYLINDER: -1.25
OD_CYLINDER: -1.00
OS_AXIS: 94
OS_SPHERE: +1.75
OD_AXIS: 112
OD_SPHERE: +1.00

## 2022-07-05 ASSESSMENT — LID EXAM ASSESSMENTS
OS_BLEPHARITIS: LLL LUL T
OS_COMMENTS: 1+ MGD
OD_COMMENTS: 1+ MGD
OD_BLEPHARITIS: RLL RUL T

## 2022-07-05 ASSESSMENT — REFRACTION_MANIFEST
OS_VA1: 20/20-1
OS_AXIS: 075
OD_SPHERE: +1.25
OD_ADD: +2.50
OS_CYLINDER: -0.50
OD_VA2: 20/20-1
OS_VA2: 20/20-1
OD_CYLINDER: -0.75
OD_AXIS: 105
OS_ADD: +2.50
OS_SPHERE: +1.25
OD_VA1: 20/25-2

## 2022-07-05 ASSESSMENT — CONFRONTATIONAL VISUAL FIELD TEST (CVF)
OS_FINDINGS: FULL
OD_FINDINGS: FULL

## 2022-07-05 ASSESSMENT — REFRACTION_CURRENTRX
OD_OVR_VA: 20/
OS_CYLINDER: -0.50
OD_AXIS: 106
OS_ADD: +2.75
OS_OVR_VA: 20/
OS_AXIS: 077
OS_VPRISM_DIRECTION: BF
OD_CYLINDER: -0.75
OD_SPHERE: +1.25
OD_ADD: +2.50
OD_VPRISM_DIRECTION: BF
OS_SPHERE: +1.25

## 2022-07-05 ASSESSMENT — VISUAL ACUITY
OS_BCVA: 20/30
OD_BCVA: 20/20-1

## 2022-07-05 ASSESSMENT — PUNCTA - ASSESSMENT
OS_PUNCTA: LLL MED
OD_PUNCTA: RLL MED

## 2022-07-12 ENCOUNTER — DOCTOR'S OFFICE (OUTPATIENT)
Dept: URBAN - NONMETROPOLITAN AREA CLINIC 1 | Facility: CLINIC | Age: 62
Setting detail: OPHTHALMOLOGY
End: 2022-07-12
Payer: COMMERCIAL

## 2022-07-12 ENCOUNTER — OPTICAL OFFICE (OUTPATIENT)
Dept: URBAN - NONMETROPOLITAN AREA CLINIC 4 | Facility: CLINIC | Age: 62
Setting detail: OPHTHALMOLOGY
End: 2022-07-12
Payer: COMMERCIAL

## 2022-07-12 DIAGNOSIS — H52.223: ICD-10-CM

## 2022-07-12 DIAGNOSIS — H52.4: ICD-10-CM

## 2022-07-12 DIAGNOSIS — H52.03: ICD-10-CM

## 2022-07-12 PROCEDURE — V2750 ANTI-REFLECTIVE COATING: HCPCS | Performed by: OPTOMETRIST

## 2022-07-12 PROCEDURE — V2203 LENS SPHCYL BIFOCAL 4.00D/.1: HCPCS | Performed by: OPTOMETRIST

## 2022-07-12 PROCEDURE — V2784 LENS POLYCARB OR EQUAL: HCPCS | Performed by: OPTOMETRIST

## 2022-07-12 PROCEDURE — V2744 TINT PHOTOCHROMATIC LENS/ES: HCPCS | Performed by: OPTOMETRIST

## 2022-07-12 PROCEDURE — 92015 DETERMINE REFRACTIVE STATE: CPT | Performed by: OPTOMETRIST

## 2022-07-12 PROCEDURE — V2020 VISION SVCS FRAMES PURCHASES: HCPCS | Performed by: OPTOMETRIST

## 2022-07-12 ASSESSMENT — VISUAL ACUITY
OS_BCVA: 20/25-2
OD_BCVA: 20/30

## 2022-07-12 ASSESSMENT — KERATOMETRY
OS_K2POWER_DIOPTERS: 43.25
OS_AXISANGLE_DEGREES: 094
OD_K2POWER_DIOPTERS: 43.50
OS_K1POWER_DIOPTERS: 42.75
OD_K1POWER_DIOPTERS: 43.00
OD_AXISANGLE_DEGREES: 096

## 2022-07-12 ASSESSMENT — REFRACTION_CURRENTRX
OS_SPHERE: +1.25
OS_CYLINDER: -0.50
OS_ADD: +2.50
OS_AXIS: 074
OD_OVR_VA: 20/
OD_AXIS: 106
OD_ADD: +2.50
OD_SPHERE: +1.25
OS_OVR_VA: 20/
OS_VPRISM_DIRECTION: BF
OD_CYLINDER: -0.75
OD_VPRISM_DIRECTION: BF

## 2022-07-12 ASSESSMENT — REFRACTION_MANIFEST
OD_VA2: 20/25-2
OS_VA1: 20/30+2
OD_CYLINDER: -0.75
OS_CYLINDER: -0.75
OD_ADD: +2.75
OS_AXIS: 085
OS_ADD: +2.75
OD_SPHERE: +0.50
OS_VA2: 20/30+2
OS_SPHERE: +0.75
OD_VA1: 20/25-2
OD_AXIS: 105

## 2022-07-12 ASSESSMENT — REFRACTION_AUTOREFRACTION
OD_CYLINDER: 0.00
OD_SPHERE: 0.00
OS_SPHERE: +0.50
OS_CYLINDER: -0.50
OS_AXIS: 84
OD_AXIS: 180

## 2022-07-12 ASSESSMENT — SPHEQUIV_DERIVED
OS_SPHEQUIV: 0.25
OS_SPHEQUIV: 0.375
OD_SPHEQUIV: 0.125
OD_SPHEQUIV: 0

## 2022-07-12 ASSESSMENT — AXIALLENGTH_DERIVED
OD_AL: 23.6841
OS_AL: 23.6785
OS_AL: 23.6295
OD_AL: 23.6351

## 2022-07-26 ENCOUNTER — DOCTOR'S OFFICE (OUTPATIENT)
Dept: URBAN - NONMETROPOLITAN AREA CLINIC 1 | Facility: CLINIC | Age: 62
Setting detail: OPHTHALMOLOGY
End: 2022-07-26
Payer: COMMERCIAL

## 2022-07-26 ENCOUNTER — RX ONLY (RX ONLY)
Age: 62
End: 2022-07-26

## 2022-07-26 DIAGNOSIS — H40.013: ICD-10-CM

## 2022-07-26 DIAGNOSIS — H10.13: ICD-10-CM

## 2022-07-26 PROBLEM — H25.13 CATARACT NUCLEAR SCLEROSIS; BOTH EYES: Status: ACTIVE | Noted: 2017-05-23

## 2022-07-26 PROBLEM — E11.9 DIABETES TYPE 2 NO RETINOPATHY; 
BOTH EYES: Status: ACTIVE | Noted: 2020-01-07

## 2022-07-26 PROBLEM — H04.123 DRY EYE; RIGHT EYE, LEFT EYE, BOTH EYES: Status: ACTIVE | Noted: 2019-12-10

## 2022-07-26 PROBLEM — H04.122 DRY EYE; RIGHT EYE, LEFT EYE, BOTH EYES: Status: ACTIVE | Noted: 2019-12-10

## 2022-07-26 PROBLEM — H52.03 HYPEROPIA; BOTH EYES: Status: ACTIVE | Noted: 2018-07-03

## 2022-07-26 PROBLEM — H04.121 DRY EYE; RIGHT EYE, LEFT EYE, BOTH EYES: Status: ACTIVE | Noted: 2019-12-10

## 2022-07-26 PROBLEM — H43.813 POST VITREOUS DETACHMENT; BOTH EYES: Status: ACTIVE | Noted: 2019-06-06

## 2022-07-26 PROCEDURE — 99213 OFFICE O/P EST LOW 20 MIN: CPT | Performed by: OPHTHALMOLOGY

## 2022-07-26 ASSESSMENT — SPHEQUIV_DERIVED
OD_SPHEQUIV: 0.125
OS_SPHEQUIV: 0.25
OD_SPHEQUIV: 0
OS_SPHEQUIV: 0.375

## 2022-07-26 ASSESSMENT — KERATOMETRY
OD_K1POWER_DIOPTERS: 43.00
OD_AXISANGLE_DEGREES: 096
OD_K2POWER_DIOPTERS: 43.50
OS_K2POWER_DIOPTERS: 43.25
OS_K1POWER_DIOPTERS: 42.75
OS_AXISANGLE_DEGREES: 094

## 2022-07-26 ASSESSMENT — REFRACTION_MANIFEST
OD_ADD: +2.75
OS_VA1: 20/30+2
OD_VA1: 20/25-2
OD_AXIS: 105
OD_CYLINDER: -0.75
OS_VA2: 20/30+2
OS_AXIS: 085
OS_ADD: +2.75
OD_SPHERE: +0.50
OS_CYLINDER: -0.75
OS_SPHERE: +0.75
OD_VA2: 20/25-2

## 2022-07-26 ASSESSMENT — REFRACTION_CURRENTRX
OD_SPHERE: +1.25
OD_VPRISM_DIRECTION: BF
OD_OVR_VA: 20/
OS_ADD: +2.50
OS_OVR_VA: 20/
OS_AXIS: 074
OD_ADD: +2.50
OS_VPRISM_DIRECTION: BF
OD_AXIS: 106
OS_SPHERE: +1.25
OD_CYLINDER: -0.75
OS_CYLINDER: -0.50

## 2022-07-26 ASSESSMENT — VISUAL ACUITY
OS_BCVA: 20/20-1
OD_BCVA: 20/25+2

## 2022-07-26 ASSESSMENT — REFRACTION_AUTOREFRACTION
OS_AXIS: 84
OD_AXIS: 180
OS_SPHERE: +0.50
OD_SPHERE: 0.00
OD_CYLINDER: 0.00
OS_CYLINDER: -0.50

## 2022-07-26 ASSESSMENT — AXIALLENGTH_DERIVED
OD_AL: 23.6351
OS_AL: 23.6295
OD_AL: 23.6841
OS_AL: 23.6785

## 2022-07-26 ASSESSMENT — PUNCTA - ASSESSMENT
OD_PUNCTA: RLL MED
OS_PUNCTA: LLL MED

## 2022-07-26 ASSESSMENT — LID EXAM ASSESSMENTS
OS_COMMENTS: 1+ MGD
OD_BLEPHARITIS: RLL RUL T
OD_COMMENTS: 1+ MGD
OS_BLEPHARITIS: LLL LUL T

## 2022-07-26 ASSESSMENT — CONFRONTATIONAL VISUAL FIELD TEST (CVF)
OD_FINDINGS: FULL
OS_FINDINGS: FULL

## 2022-09-03 ENCOUNTER — HOSPITAL ENCOUNTER (EMERGENCY)
Facility: HOSPITAL | Age: 62
Discharge: HOME | End: 2022-09-03
Attending: EMERGENCY MEDICINE | Admitting: EMERGENCY MEDICINE
Payer: COMMERCIAL

## 2022-09-03 ENCOUNTER — APPOINTMENT (OUTPATIENT)
Dept: RADIOLOGY | Facility: HOSPITAL | Age: 62
End: 2022-09-03
Payer: COMMERCIAL

## 2022-09-03 VITALS
TEMPERATURE: 96.8 F | RESPIRATION RATE: 18 BRPM | SYSTOLIC BLOOD PRESSURE: 132 MMHG | OXYGEN SATURATION: 99 % | DIASTOLIC BLOOD PRESSURE: 71 MMHG | HEART RATE: 81 BPM

## 2022-09-03 DIAGNOSIS — S46.209A INJURY OF BICEPS BRACHII MUSCLE: Primary | ICD-10-CM

## 2022-09-03 PROCEDURE — 73060 X-RAY EXAM OF HUMERUS: CPT

## 2022-09-03 PROCEDURE — 99283 EMERGENCY DEPT VISIT LOW MDM: CPT

## 2022-09-03 PROCEDURE — 99284 EMERGENCY DEPT VISIT MOD MDM: CPT | Performed by: PHYSICIAN ASSISTANT

## 2022-09-03 NOTE — DISCHARGE INSTRUCTIONS
We are concerned that you injured your biceps muscle/tendon 2, please wear sling for comfort but continue to move shoulder and do continue to move shoulder    follow-up with orthopedics for further evaluation

## 2022-09-04 NOTE — ED PROVIDER NOTES
History  Chief Complaint   Patient presents with    Arm Injury     Pt states he was picking up mulch bag and felt a pull in L arm  Bruise noted to bicep area  Pt has full ROM     The patient is a 68-year-old male who presents emergency department today with a chief complaint of left upper arm pain since yesterday  The patient states that he was lifting a bag of mulch and felt a popping sensation in his left upper arm  Patient states that since this he has had bruising, pain and difficulty with flexion of his left arm  Patient is right-handed  Patient denies any falls  Patient denies any numbness or tingling  Patient has not taken anything prior to arrival for pain  Arm Injury  Location:  Arm  Arm location:  L upper arm  Injury: yes    Time since incident:  1 day  Mechanism of injury comment:  Lifting heavy bag of mulch  Pain details:     Quality:  Shooting    Radiates to:  Does not radiate    Severity:  Moderate    Onset quality:  Unable to specify    Timing:  Constant    Progression:  Unchanged  Handedness:  Right-handed  Dislocation: no    Foreign body present:  No foreign bodies  Tetanus status:  Unknown  Prior injury to area:  No  Relieved by:  Nothing  Worsened by: Movement  Ineffective treatments:  Rest  Associated symptoms: no back pain, no decreased range of motion, no fever, no neck pain, no stiffness, no swelling and no tingling        Prior to Admission Medications   Prescriptions Last Dose Informant Patient Reported? Taking? apixaban (Eliquis) 5 mg   Yes No   Sig: Take 5 mg by mouth 2 (two) times a day   dexlansoprazole (Dexilant) 60 MG capsule   Yes No   Sig: Take 1 Cap by mouth daily     furosemide (Lasix) 20 mg tablet   Yes No   Sig: Take by mouth every 3 (three) days   insulin glargine (LANTUS) 100 units/mL subcutaneous injection   Yes No   Sig: Inject 25 Units under the skin 15 in morning and 10 at night   levothyroxine 50 mcg tablet   Yes No   lisinopril (ZESTRIL) 20 mg tablet   Yes No   Sig: Take 20 mg by mouth daily   loratadine (CLARITIN) 10 mg tablet   Yes No   Sig: Take by mouth   magnesium (MAGTAB) 84 MG (7MEQ) TBCR   No No   Sig: Take 1 tablet (84 mg total) by mouth daily for 5 days   meclizine (ANTIVERT) 25 mg tablet   Yes No   Sig: Take by mouth   tamsulosin (Flomax) 0 4 mg   Yes No   Sig: Take by mouth   venlafaxine (Effexor XR) 150 mg 24 hr capsule   Yes No   Sig: Take by mouth      Facility-Administered Medications: None       Past Medical History:   Diagnosis Date    Acute renal failure (ARF) (HCC)     BPH (benign prostatic hyperplasia)     Chronic pancreatitis (HCC)     Diabetes mellitus (HCC)     Disease of thyroid gland     GERD (gastroesophageal reflux disease)     High cholesterol     Hypertension     IBS (irritable bowel syndrome)     Obstructive jaundice     Pancreatic necrosis        Past Surgical History:   Procedure Laterality Date    ABDOMINAL SURGERY      CHOLECYSTECTOMY      PANCREATIC CYST DRAINAGE         History reviewed  No pertinent family history  I have reviewed and agree with the history as documented  E-Cigarette/Vaping    E-Cigarette Use Never User      E-Cigarette/Vaping Substances     Social History     Tobacco Use    Smoking status: Never Smoker    Smokeless tobacco: Never Used   Vaping Use    Vaping Use: Never used   Substance Use Topics    Alcohol use: Never    Drug use: Never       Review of Systems   Constitutional: Negative for chills and fever  HENT: Negative for ear pain  Eyes: Negative for pain and visual disturbance  Respiratory: Negative for shortness of breath and stridor  Cardiovascular: Negative for chest pain and palpitations  Gastrointestinal: Negative for abdominal pain, nausea and vomiting  Genitourinary: Negative for dysuria and hematuria  Musculoskeletal: Negative for arthralgias, back pain, neck pain and stiffness  Skin: Negative for color change and rash     Neurological: Negative for seizures and speech difficulty  All other systems reviewed and are negative  Physical Exam  Physical Exam  Vitals and nursing note reviewed  Constitutional:       Appearance: He is well-developed  HENT:      Head: Normocephalic and atraumatic  Mouth/Throat:      Mouth: Mucous membranes are moist    Eyes:      Extraocular Movements: Extraocular movements intact  Conjunctiva/sclera: Conjunctivae normal       Pupils: Pupils are equal, round, and reactive to light  Cardiovascular:      Rate and Rhythm: Normal rate and regular rhythm  Heart sounds: No murmur heard  Pulmonary:      Effort: Pulmonary effort is normal  No respiratory distress  Breath sounds: Normal breath sounds  Abdominal:      Palpations: Abdomen is soft  Tenderness: There is no abdominal tenderness  There is no right CVA tenderness or left CVA tenderness  Musculoskeletal:      Left shoulder: Decreased range of motion  Left elbow: Tenderness present  Arms:       Cervical back: Neck supple  Right lower leg: No edema  Left lower leg: No edema  Comments: Patient has noted bruising over the patient's left upper arm were tender  Also tender in left AC over biceps tendon   Skin:     General: Skin is warm and dry  Neurological:      Mental Status: He is alert           Vital Signs  ED Triage Vitals [09/03/22 1721]   Temperature Pulse Respirations Blood Pressure SpO2   (!) 96 8 °F (36 °C) 81 18 132/71 99 %      Temp Source Heart Rate Source Patient Position - Orthostatic VS BP Location FiO2 (%)   Temporal -- Sitting Right arm --      Pain Score       6           Vitals:    09/03/22 1721   BP: 132/71   Pulse: 81   Patient Position - Orthostatic VS: Sitting         Visual Acuity      ED Medications  Medications - No data to display    Diagnostic Studies  Results Reviewed     None                 XR humerus LEFT   ED Interpretation by Genaro Myers PA-C (09/03 1750)   No acute fracture Procedures  Procedures         ED Course                               SBIRT 20yo+    Flowsheet Row Most Recent Value   SBIRT (25 yo +)    In order to provide better care to our patients, we are screening all of our patients for alcohol and drug use  Would it be okay to ask you these screening questions? No Filed at: 09/03/2022 1722                    [place]  Number of Diagnoses or Management Options  Injury of biceps brachii muscle  Diagnosis management comments: Concern for biceps tendon/muscle injury at this time placed in sling educated on diagnosis and treatment plan  Patient has oxycodone at home from ongoing chronic pain therapy  Patient instructed to follow-up with orthopedics  Amount and/or Complexity of Data Reviewed  Tests in the radiology section of CPT®: ordered and reviewed  Decide to obtain previous medical records or to obtain history from someone other than the patient: yes  Review and summarize past medical records: yes  Independent visualization of images, tracings, or specimens: yes    Risk of Complications, Morbidity, and/or Mortality  Presenting problems: low  Diagnostic procedures: low  Management options: low    Patient Progress  Patient progress: stable      Disposition  Final diagnoses:   Injury of biceps brachii muscle     Time reflects when diagnosis was documented in both MDM as applicable and the Disposition within this note     Time User Action Codes Description Comment    9/3/2022  5:51 PM [address] [A03 094P] Injury of biceps brachii muscle       ED Disposition     ED Disposition   Discharge    Condition   Stable    Date/Time   Sat Sep 3, 2022  5:51 PM    Comment   Joslyn Shen discharge to home/self care                 Follow-up Information     Follow up With Specialties Details Why [address] [facility] Orthopedic Surgery Schedule an appointment as soon as possible for a visit   [facility]  [address] [place] [address]  [phone] Discharge Medication List as of 9/3/2022  5:54 PM      CONTINUE these medications which have NOT CHANGED    Details   apixaban (Eliquis) 5 mg Take 5 mg by mouth 2 (two) times a day, Historical Med      dexlansoprazole (Dexilant) 60 MG capsule Take 1 Cap by mouth daily  , Historical Med      furosemide (Lasix) 20 mg tablet Take by mouth every 3 (three) days, Historical Med      insulin glargine (LANTUS) 100 units/mL subcutaneous injection Inject 25 Units under the skin 15 in morning and 10 at night, Historical Med      levothyroxine 50 mcg tablet Starting Thu 4/19/2018, Historical Med      lisinopril (ZESTRIL) 20 mg tablet Take 20 mg by mouth daily, Historical Med      loratadine (CLARITIN) 10 mg tablet Take by mouth, Starting Tue 9/1/2015, Historical Med      magnesium (MAGTAB) 84 MG (7MEQ) TBCR Take 1 tablet (84 mg total) by mouth daily for 5 days, Starting Sat 3/7/2020, Until Thu 3/12/2020, Normal      meclizine (ANTIVERT) 25 mg tablet Take by mouth, Historical Med      tamsulosin (Flomax) 0 4 mg Take by mouth, Historical Med      venlafaxine (Effexor XR) 150 mg 24 hr capsule Take by mouth, Historical Med                 PDMP Review     None          ED Provider  Electronically Signed by           Apurva Osorio PA-C  09/03/22 8744      ED Attending Attestation:   Von Beard MD, have discussed the patient with the resident/non-physician practitioner and agree with the resident's/non-physician practitioner's findings, Plan of Care, and MDM as documented in the resident's/non-physician practitioner's note, except where noted  All available labs and Radiology studies were reviewed  I was present for key portions of any procedure(s) performed by the resident/non-physician practitioner and I was immediately available to provide assistance  At this point I agree with the current assessment done in the Emergency Department       Sebastián Batista MD  09/04/22 2531

## 2022-09-12 ENCOUNTER — OFFICE VISIT (OUTPATIENT)
Dept: OBGYN CLINIC | Facility: CLINIC | Age: 62
End: 2022-09-12
Payer: COMMERCIAL

## 2022-09-12 VITALS
BODY MASS INDEX: 29.34 KG/M2 | HEIGHT: 68 IN | TEMPERATURE: 97.2 F | RESPIRATION RATE: 20 BRPM | WEIGHT: 193.6 LBS | DIASTOLIC BLOOD PRESSURE: 60 MMHG | HEART RATE: 75 BPM | SYSTOLIC BLOOD PRESSURE: 106 MMHG

## 2022-09-12 DIAGNOSIS — S46.212A BICEPS STRAIN, LEFT, INITIAL ENCOUNTER: ICD-10-CM

## 2022-09-12 DIAGNOSIS — M25.512 ACUTE PAIN OF LEFT SHOULDER: Primary | ICD-10-CM

## 2022-09-12 PROCEDURE — 99204 OFFICE O/P NEW MOD 45 MIN: CPT | Performed by: ORTHOPAEDIC SURGERY

## 2022-09-12 RX ORDER — BLOOD SUGAR DIAGNOSTIC
STRIP MISCELLANEOUS
COMMUNITY
Start: 2022-07-28

## 2022-09-12 RX ORDER — FLUTICASONE PROPIONATE 50 MCG
SPRAY, SUSPENSION (ML) NASAL
COMMUNITY
End: 2022-09-12

## 2022-09-12 RX ORDER — UREA 10 %
LOTION (ML) TOPICAL
COMMUNITY

## 2022-09-12 RX ORDER — PEN NEEDLE, DIABETIC 31 GX5/16"
NEEDLE, DISPOSABLE MISCELLANEOUS
COMMUNITY
Start: 2022-07-11

## 2022-09-12 RX ORDER — LEVOTHYROXINE SODIUM 0.05 MG/1
50 TABLET ORAL DAILY
COMMUNITY

## 2022-09-12 RX ORDER — SIMVASTATIN 40 MG
TABLET ORAL
COMMUNITY

## 2022-09-12 RX ORDER — FENOFIBRATE 145 MG/1
TABLET, COATED ORAL
COMMUNITY
End: 2022-09-12

## 2022-09-12 RX ORDER — SILODOSIN 4 MG/1
1 CAPSULE ORAL DAILY
COMMUNITY
Start: 2022-06-22

## 2022-09-12 RX ORDER — PANCRELIPASE 36000; 180000; 114000 [USP'U]/1; [USP'U]/1; [USP'U]/1
CAPSULE, DELAYED RELEASE PELLETS ORAL
COMMUNITY
Start: 2022-08-15

## 2022-09-12 RX ORDER — AZELASTINE HYDROCHLORIDE 0.5 MG/ML
SOLUTION/ DROPS OPHTHALMIC
COMMUNITY
Start: 2022-07-11 | End: 2022-09-12

## 2022-09-12 RX ORDER — ROPINIROLE 4 MG/1
4 TABLET, FILM COATED ORAL DAILY
COMMUNITY
Start: 2022-08-28

## 2022-09-12 RX ORDER — NIACIN 500 MG
TABLET ORAL
COMMUNITY

## 2022-09-12 RX ORDER — ERGOCALCIFEROL (VITAMIN D2) 1250 MCG
50000 CAPSULE ORAL
COMMUNITY

## 2022-09-12 RX ORDER — LANCETS 33 GAUGE
EACH MISCELLANEOUS
COMMUNITY

## 2022-09-12 RX ORDER — LISINOPRIL 10 MG/1
TABLET ORAL
COMMUNITY
Start: 2022-08-04

## 2022-09-12 RX ORDER — LANCETS 33 GAUGE
EACH MISCELLANEOUS
COMMUNITY
Start: 2022-07-28

## 2022-09-12 RX ORDER — PEN NEEDLE, DIABETIC 32 GX 1/4"
NEEDLE, DISPOSABLE MISCELLANEOUS
COMMUNITY
Start: 2022-07-01

## 2022-09-12 RX ORDER — OXYCODONE HYDROCHLORIDE 15 MG/1
TABLET ORAL
COMMUNITY
Start: 2022-09-02

## 2022-09-12 RX ORDER — SILODOSIN 8 MG/1
CAPSULE ORAL
COMMUNITY
Start: 2016-03-30

## 2022-09-12 NOTE — PROGRESS NOTES
ASSESSMENT/PLAN:    Diagnoses and all orders for this visit:    Acute pain of left shoulder    Biceps strain, left, initial encounter  -     Ambulatory referral to Orthopedic Surgery        Plan:  I discussed treatment options  Although physical therapy might be of benefit, he is doing quite well at this point time only 11 days post injury  He did not feel the need to attend physical therapy but will work on increasing his activity level as tolerated  He was to contact the office if he did not see significant further improvement over the next 10 days to 2 weeks  If he would change his mind regarding physical therapy, a prescription can be placed in his chart knee can initiate physical therapy and then I would see him 3 or 4 weeks after initiating therapy  Return if symptoms worsen or fail to improve       _____________________________________________________  CHIEF COMPLAINT:  Chief Complaint   Patient presents with    Left Arm - Pain         SUBJECTIVE:  Jose Knowles is a 57y o  year old right-handed male who presents for evaluation of his left arm  He was lifting bags of mulch on 09/01/2022 when he felt something tear and his left shoulder  Pain radiated distally to the mid arm and he noted swelling and ecchymosis  He was seen in the emergency room at [address] [place] [address] 2 days afterwards on 09/03/2022  X-rays were obtained and he was referred for orthopedic consultation and treatment  He has noted significant improvement since that time with resolution of the swelling and ecchymosis  He has not try to do any kind of heavy manual labor with his left upper extremity at this time but has no difficulties with activities of daily living  He denies paresthesias  He denies history of prior symptoms about his left shoulder        PAST MEDICAL HISTORY:  Past Medical History:   Diagnosis Date    [facility] failure [facility]F) [facility]     BPH (benign prostatic hyperplasia)     [facility]  [facility] [address] [facility]     Disease of thyroid gland     GERD (gastroesophageal reflux disease)     High cholesterol     Hypertension     IBS (irritable bowel syndrome)     Obstructive jaundice     Pancreatic necrosis        PAST SURGICAL HISTORY:  Past Surgical History:   Procedure Laterality Date    ABDOMINAL SURGERY      CHOLECYSTECTOMY      PANCREATIC CYST DRAINAGE         FAMILY HISTORY:  Family History   Problem Relation Age of Onset    No Known Problems Mother     Cancer Father        SOCIAL HISTORY:  Social History     Tobacco Use    Smoking status: Never Smoker    Smokeless tobacco: Never Used   Vaping Use    Vaping Use: Never used   Substance Use Topics    Alcohol use: Never    Drug use: Never       MEDICATIONS:    Current Outpatient Medications:     apixaban (ELIQUIS) 5 mg, Take 5 mg by mouth 2 (two) times a day, Disp: , Rfl:     BD Pen Needle Micro U/F 32G X 6 MM MISC, OZEMPIC PEN NEEDLES INJECT WEEKLY, Disp: , Rfl:     Cholecalciferol 25 MCG (1000 UT) CHEW, , Disp: , Rfl:     Creon 75098-971343 units CPEP, TAKE 2 CAPS BY MOUTH WITH EACH MEAL AND 1 CAP WITH SNACKS, Disp: , Rfl:     dexlansoprazole (DEXILANT) 60 MG capsule, Take 1 Cap by mouth daily  , Disp: , Rfl:     Easy Touch Pen Needles 31G X 8 MM MISC, , Disp: , Rfl:     Empagliflozin (JARDIANCE PO), , Disp: , Rfl:     ergocalciferol (ERGOCALCIFEROL) 1 25 MG (02614 UT) capsule, Take 50,000 Units by mouth, Disp: , Rfl:     furosemide (LASIX) 20 mg tablet, Take by mouth every 3 (three) days, Disp: , Rfl:     insulin glargine (LANTUS) 100 units/mL subcutaneous injection, Inject 25 Units under the skin 15 in morning and 10 at night, Disp: , Rfl:     Lactobacillus TABS, Take by mouth, Disp: , Rfl:     Lancets (OneTouch Delica Plus [id number]) MISC, TEST TWICE A DAY, Disp: , Rfl:     levothyroxine 50 mcg tablet, Take 50 mcg by mouth daily, Disp: , Rfl:     lisinopril (ZESTRIL) 10 mg tablet, , Disp: , Rfl:     loratadine (CLARITIN) 10 mg tablet, Take by mouth, Disp: , Rfl:     MAGNESIUM PO, , Disp: , Rfl:     meclizine (ANTIVERT) 25 mg tablet, Take by mouth, Disp: , Rfl:     niacin 500 mg tablet, , Disp: , Rfl:     OneTouch Delica Lancets 24I MISC, , Disp: , Rfl:     OneTouch Ultra test strip, TEST TWICE A DAY, Disp: , Rfl:     oxyCODONE (ROXICODONE) 15 mg immediate release tablet, , Disp: , Rfl:     rOPINIRole (REQUIP) 4 mg tablet, Take 4 mg by mouth daily, Disp: , Rfl:     Semaglutide (OZEMPIC, 1 MG/DOSE, SC), , Disp: , Rfl:     Silodosin 4 MG CAPS, Take 1 capsule by mouth daily, Disp: , Rfl:     Silodosin 8 MG CAPS, Take by mouth, Disp: , Rfl:     simvastatin (ZOCOR) 40 mg tablet, Take by mouth, Disp: , Rfl:     tamsulosin (FLOMAX) 0 4 mg, Take by mouth, Disp: , Rfl:     venlafaxine (EFFEXOR-XR) 150 mg 24 hr capsule, Take by mouth, Disp: , Rfl:     magnesium (MAGTAB) 84 MG (7MEQ) TBCR, Take 1 tablet (84 mg total) by mouth daily for 5 days, Disp: 5 tablet, Rfl: 0    ALLERGIES:  Allergies   Allergen Reactions    Empagliflozin Other (See Comments)     Severe pancreatitis     Ketotifen Fumarate Other (See Comments)     Eye blurry   Eye blurry       Prozac [Fluoxetine]        Review of systems:   Constitutional: Negative for fatigue, fever or loss of apetite  HENT: Negative  Respiratory: Negative for shortness of breath, dyspnea  Cardiovascular: Negative for chest pain/tightness  Gastrointestinal: Negative for abdominal pain, N/V  Endocrine: Negative for cold/heat intolerance, unexplained weight loss/gain  Genitourinary: Negative for flank pain, dysuria, hematuria  Musculoskeletal:  Positive as in the HPI   Skin: Negative for rash  Neurological:  Negative  Psychiatric/Behavioral: Negative for agitation  _____________________________________________________  PHYSICAL EXAMINATION:    Blood pressure 106/60, pulse 75, temperature (!) 97 2 °F (36 2 °C), resp   rate 20, height 5' 8" (1 727 m), weight 87 8 kg (193 lb 9 6 oz)  General: well developed and well nourished, alert, oriented times 3 and appears comfortable  Psychiatric: Normal  HEENT: Benign  Cardiovascular: Regular    Pulmonary: No wheezing or stridor  Abdomen: Soft, Nontender  Skin: No masses, erythema, lacerations, fluctation, ulcerations  Neurovascular: Motor and sensory exams are intact including excellent strength of left upper extremity forearm supination and elbow flexion  Pulses are palpable  MUSCULOSKELETAL EXAMINATION:    The left upper extremity exam demonstrates deformity of the biceps muscle  There is no swelling or ecchymosis  He has excellent range of motion of the shoulder, elbow, forearm, wrist and hand with complaints of mild discomfort during shoulder motion  He has a palpable deformity of the biceps muscle in visible deformity during contraction  He has excellent strength, however, of forearm supination and elbow flexion with minimal pain  The remainder of the upper extremity exam bilaterally is benign  _____________________________________________________  STUDIES REVIEWED:  X-rays of his left humerus demonstrated no bony abnormality  There are no degenerative changes noted at the elbow or shoulder as visualized  The report was reviewed  The ER note was reviewed        Phuc Feng

## 2023-02-06 ENCOUNTER — DOCTOR'S OFFICE (OUTPATIENT)
Dept: URBAN - NONMETROPOLITAN AREA CLINIC 1 | Facility: CLINIC | Age: 63
Setting detail: OPHTHALMOLOGY
End: 2023-02-06
Payer: COMMERCIAL

## 2023-02-06 DIAGNOSIS — H10.13: ICD-10-CM

## 2023-02-06 DIAGNOSIS — H21.233: ICD-10-CM

## 2023-02-06 DIAGNOSIS — H40.013: ICD-10-CM

## 2023-02-06 DIAGNOSIS — E11.9: ICD-10-CM

## 2023-02-06 DIAGNOSIS — H04.123: ICD-10-CM

## 2023-02-06 PROCEDURE — 92083 EXTENDED VISUAL FIELD XM: CPT | Performed by: OPHTHALMOLOGY

## 2023-02-06 PROCEDURE — 99213 OFFICE O/P EST LOW 20 MIN: CPT | Performed by: OPHTHALMOLOGY

## 2023-02-06 PROCEDURE — 92020 GONIOSCOPY: CPT | Performed by: OPHTHALMOLOGY

## 2023-02-06 PROCEDURE — 76514 ECHO EXAM OF EYE THICKNESS: CPT | Performed by: OPHTHALMOLOGY

## 2023-02-06 ASSESSMENT — AXIALLENGTH_DERIVED
OS_AL: 23.7685
OS_AL: 23.3318
OD_AL: 23.5891
OD_AL: 23.2531

## 2023-02-06 ASSESSMENT — REFRACTION_CURRENTRX
OS_VPRISM_DIRECTION: BF
OS_OVR_VA: 20/
OD_SPHERE: +1.25
OD_ADD: +2.50
OS_AXIS: 074
OS_ADD: +2.50
OD_CYLINDER: -0.75
OS_CYLINDER: -0.50
OS_SPHERE: +1.25
OD_AXIS: 106
OD_OVR_VA: 20/
OD_VPRISM_DIRECTION: BF

## 2023-02-06 ASSESSMENT — CONFRONTATIONAL VISUAL FIELD TEST (CVF)
OS_FINDINGS: FULL
OD_FINDINGS: FULL

## 2023-02-06 ASSESSMENT — REFRACTION_AUTOREFRACTION
OD_AXIS: 123
OD_CYLINDER: -1.00
OS_AXIS: 080
OS_SPHERE: +2.00
OD_SPHERE: +1.50
OS_CYLINDER: -1.00

## 2023-02-06 ASSESSMENT — LID EXAM ASSESSMENTS
OD_BLEPHARITIS: RLL RUL T
OD_COMMENTS: 1+ MGD
OS_COMMENTS: 1+ MGD
OS_BLEPHARITIS: LLL LUL T

## 2023-02-06 ASSESSMENT — REFRACTION_MANIFEST
OD_VA2: 20/25-2
OS_ADD: +2.75
OS_SPHERE: +0.75
OD_AXIS: 105
OD_CYLINDER: -0.75
OS_CYLINDER: -0.75
OD_SPHERE: +0.50
OS_AXIS: 085
OD_VA1: 20/25-2
OS_VA2: 20/30+2
OD_ADD: +2.75
OS_VA1: 20/30+2

## 2023-02-06 ASSESSMENT — SPHEQUIV_DERIVED
OD_SPHEQUIV: 0.125
OS_SPHEQUIV: 1.5
OS_SPHEQUIV: 0.375
OD_SPHEQUIV: 1

## 2023-02-06 ASSESSMENT — PACHYMETRY
OD_CT_CORRECTION: -1
OS_CT_UM: 574
OS_CT_CORRECTION: -2
OD_CT_UM: 558

## 2023-02-06 ASSESSMENT — CORNEAL DYSTROPHY - POSTERIOR
OS_POSTERIORDYSTROPHY: T GUTTATA
OD_POSTERIORDYSTROPHY: T GUTTATA

## 2023-02-06 ASSESSMENT — VISUAL ACUITY
OD_BCVA: 20/25-2
OS_BCVA: 20/25-2

## 2023-02-06 ASSESSMENT — TONOMETRY: OD_IOP_MMHG: 15

## 2023-02-06 ASSESSMENT — PUNCTA - ASSESSMENT
OS_PUNCTA: LLL MED
OD_PUNCTA: RLL MED

## 2023-02-06 ASSESSMENT — KERATOMETRY
OD_K1POWER_DIOPTERS: 42.75
OD_AXISANGLE_DEGREES: 006
OD_K2POWER_DIOPTERS: 44.00
OS_K1POWER_DIOPTERS: 42.25
OS_K2POWER_DIOPTERS: 43.00
OS_AXISANGLE_DEGREES: 018

## 2023-03-08 ENCOUNTER — OPTICAL OFFICE (OUTPATIENT)
Dept: URBAN - NONMETROPOLITAN AREA CLINIC 4 | Facility: CLINIC | Age: 63
Setting detail: OPHTHALMOLOGY
End: 2023-03-08

## 2023-03-08 DIAGNOSIS — H52.7: ICD-10-CM

## 2023-03-08 PROCEDURE — V2020 VISION SVCS FRAMES PURCHASES: HCPCS | Performed by: OPTOMETRIST

## 2023-05-10 ENCOUNTER — APPOINTMENT (EMERGENCY)
Dept: RADIOLOGY | Facility: HOSPITAL | Age: 63
End: 2023-05-10

## 2023-05-10 ENCOUNTER — HOSPITAL ENCOUNTER (EMERGENCY)
Facility: HOSPITAL | Age: 63
Discharge: HOME | End: 2023-05-10
Attending: EMERGENCY MEDICINE

## 2023-05-10 VITALS
HEIGHT: 68 IN | WEIGHT: 198 LBS | RESPIRATION RATE: 20 BRPM | TEMPERATURE: 97.5 F | HEART RATE: 76 BPM | SYSTOLIC BLOOD PRESSURE: 123 MMHG | DIASTOLIC BLOOD PRESSURE: 69 MMHG | BODY MASS INDEX: 30.01 KG/M2 | OXYGEN SATURATION: 98 %

## 2023-05-10 DIAGNOSIS — S83.92XA SPRAIN OF LEFT KNEE, UNSPECIFIED LIGAMENT, INITIAL ENCOUNTER: Primary | ICD-10-CM

## 2023-05-10 RX ORDER — KETOROLAC TROMETHAMINE 30 MG/ML
15 INJECTION, SOLUTION INTRAMUSCULAR; INTRAVENOUS ONCE
Status: COMPLETED | OUTPATIENT
Start: 2023-05-10 | End: 2023-05-10

## 2023-05-10 RX ADMIN — KETOROLAC TROMETHAMINE 15 MG: 30 INJECTION, SOLUTION INTRAMUSCULAR; INTRAVENOUS at 20:52

## 2023-05-11 NOTE — DISCHARGE INSTRUCTIONS
Use pain medications as prescribed  We recommend ice 4-6 times a day for 15 to 20 minutes at a time to the affected area  Try and leave the area elevated to help reduce swelling and pain  Return with any worsening, particularly increased pain, severe swelling, or any other symptomatology that seems concerning  Use immobilizer as needed  Follow-up with orthopedics

## 2023-05-11 NOTE — ED PROVIDER NOTES
History  Chief Complaint   Patient presents with   • Knee Pain     Pt arrives from home with c/o left knee pain x1 week  Pt reports possibly straining the knee in his sleep due to RLS     60year-old with left knee pain for about 1 week  No known trauma  No history of gout  No history of DVT  No reported shortness of breath  History provided by:  Patient  Knee Pain  Location:  Knee  Injury: no    Knee location:  L knee  Pain details:     Quality:  Aching    Radiates to:  Does not radiate    Severity:  No pain    Timing:  Intermittent    Progression:  Waxing and waning  Chronicity:  New  Associated symptoms: stiffness    Associated symptoms: no back pain, no decreased ROM, no itching, no neck pain, no numbness, no swelling and no tingling        Prior to Admission Medications   Prescriptions Last Dose Informant Patient Reported? Taking? BD Pen Needle Micro U/F 32G X 6 MM MISC   Yes No   Sig: OZEMPIC PEN NEEDLES INJECT WEEKLY   Cholecalciferol 25 MCG (1000 UT) CHEW   Yes No   Creon [id number] units CPEP   Yes No   Sig: TAKE 2 CAPS BY MOUTH WITH EACH MEAL AND 1 CAP WITH SNACKS   Easy Touch Pen Needles 31G X 8 MM MISC   Yes No   Empagliflozin (JARDIANCE PO)   Yes No   Lactobacillus TABS   Yes No   Sig: Take by mouth   Lancets (OneTouch Delica Plus [id number]) MISC   Yes No   Sig: TEST TWICE A DAY   MAGNESIUM PO   Yes No   OneTouch Delica Lancets 68H MISC   Yes No   OneTouch Ultra test strip   Yes No   Sig: TEST TWICE A DAY   Semaglutide (OZEMPIC, 1 MG/DOSE, SC)   Yes No   Silodosin 4 MG CAPS   Yes No   Sig: Take 1 capsule by mouth daily   Silodosin 8 MG CAPS   Yes No   Sig: Take by mouth   apixaban (ELIQUIS) 5 mg   Yes No   Sig: Take 5 mg by mouth 2 (two) times a day   dexlansoprazole (DEXILANT) 60 MG capsule   Yes No   Sig: Take 1 Cap by mouth daily     ergocalciferol (ERGOCALCIFEROL) 1 25 MG (41782 UT) capsule   Yes No   Sig: Take 50,000 Units by mouth   furosemide (LASIX) 20 mg tablet   Yes No   Sig: Take by mouth every 3 (three) days   insulin glargine (LANTUS) 100 units/mL subcutaneous injection   Yes No   Sig: Inject 25 Units under the skin 15 in morning and 10 at night   levothyroxine 50 mcg tablet   Yes No   Sig: Take 50 mcg by mouth daily   lisinopril (ZESTRIL) 10 mg tablet   Yes No   loratadine (CLARITIN) 10 mg tablet   Yes No   Sig: Take by mouth   magnesium (MAGTAB) 84 MG (7MEQ) TBCR   No No   Sig: Take 1 tablet (84 mg total) by mouth daily for 5 days   meclizine (ANTIVERT) 25 mg tablet   Yes No   Sig: Take by mouth   niacin 500 mg tablet   Yes No   oxyCODONE (ROXICODONE) 15 mg immediate release tablet   Yes No   rOPINIRole (REQUIP) 4 mg tablet   Yes No   Sig: Take 4 mg by mouth daily   simvastatin (ZOCOR) 40 mg tablet   Yes No   Sig: Take by mouth   tamsulosin (FLOMAX) 0 4 mg   Yes No   Sig: Take by mouth   venlafaxine (EFFEXOR-XR) 150 mg 24 hr capsule   Yes No   Sig: Take by mouth      Facility-Administered Medications: None       Past Medical History:   Diagnosis Date   • Acute renal failure (ARF) (HCC)    • BPH (benign prostatic hyperplasia)    • Chronic pancreatitis (HCC)    • Diabetes mellitus (HCC)    • Disease of thyroid gland    • GERD (gastroesophageal reflux disease)    • High cholesterol    • Hypertension    • IBS (irritable bowel syndrome)    • Obstructive jaundice    • Pancreatic necrosis        Past Surgical History:   Procedure Laterality Date   • ABDOMINAL SURGERY     • CHOLECYSTECTOMY     • PANCREATIC CYST DRAINAGE         Family History   Problem Relation Age of Onset   • No Known Problems Mother    • Cancer Father      I have reviewed and agree with the history as documented      E-Cigarette/Vaping   • E-Cigarette Use Never User      E-Cigarette/Vaping Substances     Social History     Tobacco Use   • Smoking status: Never   • Smokeless tobacco: Never   Vaping Use   • Vaping Use: Never used   Substance Use Topics   • Alcohol use: Never   • Drug use: Never       Review of Systems Constitutional: Negative  Respiratory: Negative  Negative for shortness of breath  Cardiovascular: Negative  Negative for chest pain  Musculoskeletal: Positive for arthralgias, gait problem and stiffness  Negative for back pain, joint swelling, myalgias and neck pain  Skin: Negative for itching  All other systems reviewed and are negative  Physical Exam  Physical Exam  Vitals and nursing note reviewed  Constitutional:       General: He is not in acute distress  Appearance: He is normal weight  He is not toxic-appearing  HENT:      Head: Normocephalic  Right Ear: External ear normal       Left Ear: External ear normal       Nose: Nose normal       Mouth/Throat:      Mouth: Mucous membranes are moist    Eyes:      Pupils: Pupils are equal, round, and reactive to light  Pulmonary:      Effort: Pulmonary effort is normal  No respiratory distress  Abdominal:      General: Abdomen is flat  Musculoskeletal:         General: Tenderness present  No swelling, deformity or signs of injury  Left upper leg: Normal       Left knee: No swelling, deformity, bony tenderness or crepitus  Normal range of motion  Tenderness present over the medial joint line  No lateral joint line or patellar tendon tenderness  Abnormal patellar mobility  Normal alignment and normal meniscus  Legs:    Neurological:      Mental Status: He is alert  Psychiatric:         Thought Content:  Thought content normal          Judgment: Judgment normal          Vital Signs  ED Triage Vitals   Temperature Pulse Respirations Blood Pressure SpO2   05/10/23 2020 05/10/23 2020 05/10/23 2020 05/10/23 2022 05/10/23 2022   97 5 °F (36 4 °C) 80 17 (!) 138/103 94 %      Temp Source Heart Rate Source Patient Position - Orthostatic VS BP Location FiO2 (%)   05/10/23 2020 05/10/23 2020 05/10/23 2020 05/10/23 2020 --   Temporal Monitor Sitting Right arm       Pain Score       05/10/23 2020       6           Vitals: 05/10/23 2020 05/10/23 2022 05/10/23 2045 05/10/23 2122   BP:  (!) 138/103 126/81 123/69   Pulse: 80   76   Patient Position - Orthostatic VS: Sitting  Lying Lying         Visual Acuity      ED Medications  Medications   ketorolac (TORADOL) injection 15 mg (15 mg Intramuscular Given 5/10/23 2052)       Diagnostic Studies  Results Reviewed     None                 XR knee 4+ vw left injury   ED Interpretation by Shawna Slade DO (05/10 2050)   NAD                 Procedures  Procedures         ED Course                               SBIRT 22yo+    Flowsheet Row Most Recent Value   Initial Alcohol Screen: US AUDIT-C     1  How often do you have a drink containing alcohol? 0 Filed at: 05/10/2023 2047   2  How many drinks containing alcohol do you have on a typical day you are drinking? 0 Filed at: 05/10/2023 2047   3a  Male UNDER 65: How often do you have five or more drinks on one occasion? 0 Filed at: 05/10/2023 2047   Audit-C Score 0 Filed at: 05/10/2023 2047   DAYAN: How many times in the past year have you    Used an illegal drug or used a prescription medication for non-medical reasons? Never Filed at: 05/10/2023 2047                    Medical Decision Making  Patient presented to the emergency department and a MSE was performed  The patient was evaluated for complaint related to acute left knee pain  Patient is potentially at risk for, but not limited to, strain, sprain, fracture, dislocation or ligamentous disruption  Several of these diagnoses have been evaluated and ruled out by history and physical   As needed, patient will be further evaluated with laboratory and imaging studies  Higher level diagnostics, such as CT imaging or ultrasound, may also be required  Please see work-up portion of the note for further evaluation of patient's risk  Socioeconomic factors were also considered as part of the decision-making process    Unless otherwise stated in the chart or patient is admitted as elsewhere documented, any previously prescribed medications will be maintained  Sprain of left knee, unspecified ligament, initial encounter: acute illness or injury     Details: No clinical evidence of DVT  No joint swelling  Appears to be consistent with muscle strain  Recommended follow-up with orthopedics  Patient was placed in a short knee immobilizer to stabilize the joint provide symptomatic relief  Amount and/or Complexity of Data Reviewed  Radiology: ordered and independent interpretation performed  Risk  Prescription drug management  Disposition  Final diagnoses:   Sprain of left knee, unspecified ligament, initial encounter     Time reflects when diagnosis was documented in both MDM as applicable and the Disposition within this note     Time User Action Codes Description Comment    5/10/2023  8:55 PM Lizbeth Estes Add Snowy Belling  92XA] Sprain of left knee, unspecified ligament, initial encounter       ED Disposition     ED Disposition   Discharge    Condition   Stable    Date/Time   Wed May 10, 2023  8:54 PM    Ritesh discharge to home/self care  Follow-up Information     Follow up With Specialties Details Why [address] Orthopedic Surgery In 1 week If not better 3 [facility]  [phone]            Patient's Medications   Discharge Prescriptions    No medications on file       No discharge procedures on file      PDMP Review     None          ED Provider  Electronically Signed by           Danelle Shaw DO  05/10/23 9106

## 2023-06-28 ENCOUNTER — OFFICE VISIT (OUTPATIENT)
Dept: OBGYN CLINIC | Facility: CLINIC | Age: 63
End: 2023-06-28
Payer: COMMERCIAL

## 2023-06-28 VITALS
SYSTOLIC BLOOD PRESSURE: 120 MMHG | TEMPERATURE: 97.6 F | HEART RATE: 87 BPM | HEIGHT: 68 IN | DIASTOLIC BLOOD PRESSURE: 80 MMHG | BODY MASS INDEX: 30.37 KG/M2 | WEIGHT: 200.4 LBS

## 2023-06-28 DIAGNOSIS — G89.29 CHRONIC PAIN OF LEFT KNEE: Primary | ICD-10-CM

## 2023-06-28 DIAGNOSIS — M17.12 PRIMARY OSTEOARTHRITIS OF LEFT KNEE: ICD-10-CM

## 2023-06-28 DIAGNOSIS — M25.562 CHRONIC PAIN OF LEFT KNEE: Primary | ICD-10-CM

## 2023-06-28 PROCEDURE — 20610 DRAIN/INJ JOINT/BURSA W/O US: CPT | Performed by: ORTHOPAEDIC SURGERY

## 2023-06-28 PROCEDURE — 99214 OFFICE O/P EST MOD 30 MIN: CPT | Performed by: ORTHOPAEDIC SURGERY

## 2023-06-28 RX ORDER — BUPIVACAINE HYDROCHLORIDE 2.5 MG/ML
4 INJECTION, SOLUTION INFILTRATION; PERINEURAL
Status: COMPLETED | OUTPATIENT
Start: 2023-06-28 | End: 2023-06-28

## 2023-06-28 RX ORDER — TRIAMCINOLONE ACETONIDE 40 MG/ML
40 INJECTION, SUSPENSION INTRA-ARTICULAR; INTRAMUSCULAR
Status: COMPLETED | OUTPATIENT
Start: 2023-06-28 | End: 2023-06-28

## 2023-06-28 RX ADMIN — BUPIVACAINE HYDROCHLORIDE 4 ML: 2.5 INJECTION, SOLUTION INFILTRATION; PERINEURAL at 15:00

## 2023-06-28 RX ADMIN — TRIAMCINOLONE ACETONIDE 40 MG: 40 INJECTION, SUSPENSION INTRA-ARTICULAR; INTRAMUSCULAR at 15:00

## 2023-06-28 NOTE — PROGRESS NOTES
ASSESSMENT/PLAN:    Diagnoses and all orders for this visit:    Chronic pain of left knee  -     Ambulatory Referral to Physical Therapy; Future    Primary osteoarthritis of left knee  -     Ambulatory Referral to Physical Therapy; Future        Plan: Treatment was discussed  He elected to proceed with injection of his left knee which was performed without difficulty  I have also recommended physical therapy, a prescription was provided and he is agreeable  I will see him in 4 weeks for reevaluation  He is to contact the office if questions or concerns arise  Return in about 4 weeks (around 7/26/2023)  _____________________________________________________  CHIEF COMPLAINT:  Chief Complaint   Patient presents with   • Left Knee - Pain         SUBJECTIVE:  Tung Navas is a 60y o  year old male who presents for evaluation of chronic pain of his left knee  He has noted episodic left knee pain over several years but this most recent episode began about 5 weeks ago  He denies having had any treatment in the past   He was seen in the emergency room for his left knee and received an injection into his right thigh  He is not sure what was injected  He has had no other treatment  He has never seen any providers for his left knee pain other than the emergency room about 5 weeks ago  He presents now for initial orthopedic evaluation and treatment  He denies pain at rest   He complains of pain with activity, morning stiffness and activity related stiffness  He denies lower extremity paresthesias and denies radiation of pain proximally or distally  He does have a history of chronic back pain and is on chronic narcotics secondary to his back pain      PAST MEDICAL HISTORY:  Past Medical History:   Diagnosis Date   • Acute renal failure ([facility]F) ([facility] )    • BPH (benign prostatic hyperplasia)    • Chronic pancreatitis ([facility] )    • Diabetes mellitus ([facility] )    • Disease of thyroid gland    • GERD (gastroesophageal reflux disease)    • High cholesterol    • Hypertension    • IBS (irritable bowel syndrome)    • Obstructive jaundice    • Pancreatic necrosis        PAST SURGICAL HISTORY:  Past Surgical History:   Procedure Laterality Date   • ABDOMINAL SURGERY     • CHOLECYSTECTOMY     • PANCREATIC CYST DRAINAGE         FAMILY HISTORY:  Family History   Problem Relation Age of Onset   • No Known Problems Mother    • Cancer Father        SOCIAL HISTORY:  Social History     Tobacco Use   • Smoking status: Never   • Smokeless tobacco: Never   Vaping Use   • Vaping Use: Never used   Substance Use Topics   • Alcohol use: Never   • Drug use: Never       MEDICATIONS:    Current Outpatient Medications:   •  apixaban (ELIQUIS) 5 mg, Take 5 mg by mouth 2 (two) times a day, Disp: , Rfl:   •  BD Pen Needle Micro U/F 32G X 6 MM MISC, OZEMPIC PEN NEEDLES INJECT WEEKLY, Disp: , Rfl:   •  Cholecalciferol 25 MCG (1000 UT) CHEW, , Disp: , Rfl:   •  Creon 47453-000632 units CPEP, TAKE 2 CAPS BY MOUTH WITH EACH MEAL AND 1 CAP WITH SNACKS, Disp: , Rfl:   •  dexlansoprazole (DEXILANT) 60 MG capsule, Take 1 Cap by mouth daily  , Disp: , Rfl:   •  Easy Touch Pen Needles 31G X 8 MM MISC, , Disp: , Rfl:   •  ergocalciferol (ERGOCALCIFEROL) 1 25 MG (56332 UT) capsule, Take 50,000 Units by mouth, Disp: , Rfl:   •  furosemide (LASIX) 20 mg tablet, Take by mouth every 3 (three) days, Disp: , Rfl:   •  insulin glargine (LANTUS) 100 units/mL subcutaneous injection, Inject 25 Units under the skin 15 in morning and 10 at night, Disp: , Rfl:   •  Lancets (OneTouch Delica Plus FPVVTU04H) MISC, TEST TWICE A DAY, Disp: , Rfl:   •  levothyroxine 50 mcg tablet, Take 50 mcg by mouth daily, Disp: , Rfl:   •  lisinopril (ZESTRIL) 10 mg tablet, , Disp: , Rfl:   •  loratadine (CLARITIN) 10 mg tablet, Take by mouth, Disp: , Rfl:   •  meclizine (ANTIVERT) 25 mg tablet, Take by mouth, Disp: , Rfl:   •  OneTouch Delica Lancets 21F MISC, , Disp: , Rfl:   •  OneTouch Ultra test "strip, TEST TWICE A DAY, Disp: , Rfl:   •  oxyCODONE (ROXICODONE) 15 mg immediate release tablet, , Disp: , Rfl:   •  rOPINIRole (REQUIP) 4 mg tablet, Take 4 mg by mouth daily, Disp: , Rfl:   •  Semaglutide (OZEMPIC, 1 MG/DOSE, SC), , Disp: , Rfl:   •  Silodosin 4 MG CAPS, Take 1 capsule by mouth daily, Disp: , Rfl:   •  Silodosin 8 MG CAPS, Take by mouth, Disp: , Rfl:   •  simvastatin (ZOCOR) 40 mg tablet, Take by mouth, Disp: , Rfl:   •  tamsulosin (FLOMAX) 0 4 mg, Take by mouth, Disp: , Rfl:   •  venlafaxine (EFFEXOR-XR) 150 mg 24 hr capsule, Take by mouth, Disp: , Rfl:   •  Empagliflozin (JARDIANCE PO), , Disp: , Rfl:   •  Lactobacillus TABS, Take by mouth, Disp: , Rfl:   •  magnesium (MAGTAB) 84 MG (7MEQ) TBCR, Take 1 tablet (84 mg total) by mouth daily for 5 days (Patient not taking: Reported on 6/28/2023), Disp: 5 tablet, Rfl: 0  •  MAGNESIUM PO, , Disp: , Rfl:   •  niacin 500 mg tablet, , Disp: , Rfl:     ALLERGIES:  Allergies   Allergen Reactions   • Empagliflozin Other (See Comments)     Severe pancreatitis    • Ketotifen Fumarate Other (See Comments)     Eye blurry   Eye blurry      • Prozac [Fluoxetine]        Review of systems:   Constitutional: Negative for fatigue, fever or loss of apetite  HENT: Negative  Respiratory: Negative for shortness of breath, dyspnea  Cardiovascular: Negative for chest pain/tightness  Gastrointestinal: Negative for abdominal pain, N/V  Endocrine: Negative for cold/heat intolerance, unexplained weight loss/gain  Genitourinary: Negative for flank pain, dysuria, hematuria  Musculoskeletal: Positive as in the HPI  Skin: Negative for rash  Neurological: Negative  Psychiatric/Behavioral: Negative for agitation  _____________________________________________________  PHYSICAL EXAMINATION:    Blood pressure 120/80, pulse 87, temperature 97 6 °F (36 4 °C), temperature source Temporal, height 5' 8\" (1 727 m), weight 90 9 kg (200 lb 6 4 oz)      General: well " developed and well nourished, alert, oriented times 3 and appears comfortable  Psychiatric: Normal  HEENT: Benign  Cardiovascular: Regular    Pulmonary: No wheezing or stridor  Abdomen: Soft, Nontender  Skin: No masses, erythema, lacerations, fluctation, ulcerations  Neurovascular: Motor and sensory exams are grossly intact and pulses palpable  MUSCULOSKELETAL EXAMINATION:    The left knee exam demonstrates full extension and flexion to 130 degrees  He does complain of mild pain with end range of flexion and with tightness during endrange extension  He has mild varus deformity  Ligaments are grossly stable  There is no effusion noted  He denies tenderness to palpation about the knee but states that the entire medial knee including the femoral condyle, joint line and tibial plateau or where the pain is present  Cindy's is negative  Apley's compression and distraction is negative  There is no patellofemoral crepitus noted  The remainder of the left lower extremity exam is grossly benign  _____________________________________________________  STUDIES REVIEWED:  X-rays demonstrate mild narrowing of the medial joint space and small osteophytes  The report was reviewed from these x-rays obtained on 5/10/2023  The ER note was reviewed  PROCEDURES:  Large joint arthrocentesis: L knee  Universal Protocol:  Consent: Verbal consent obtained    Consent given by: patient  Patient understanding: patient states understanding of the procedure being performed    Supporting Documentation  Indications: pain   Procedure Details  Location: knee - L knee  Needle size: 22 G  Ultrasound guidance: no  Approach: lateral  Medications administered: 4 mL bupivacaine 0 25 %; 40 mg triamcinolone acetonide 40 mg/mL              [facility]

## 2023-07-14 ENCOUNTER — RX ONLY (RX ONLY)
Age: 63
End: 2023-07-14

## 2023-07-14 ENCOUNTER — DOCTOR'S OFFICE (OUTPATIENT)
Dept: URBAN - NONMETROPOLITAN AREA CLINIC 1 | Facility: CLINIC | Age: 63
Setting detail: OPHTHALMOLOGY
End: 2023-07-14
Payer: COMMERCIAL

## 2023-07-14 DIAGNOSIS — H04.123: ICD-10-CM

## 2023-07-14 DIAGNOSIS — H04.122: ICD-10-CM

## 2023-07-14 DIAGNOSIS — H04.121: ICD-10-CM

## 2023-07-14 DIAGNOSIS — H40.013: ICD-10-CM

## 2023-07-14 DIAGNOSIS — H10.13: ICD-10-CM

## 2023-07-14 PROCEDURE — 99213 OFFICE O/P EST LOW 20 MIN: CPT | Performed by: OPHTHALMOLOGY

## 2023-07-14 PROCEDURE — 83861 MICROFLUID ANALY TEARS: CPT | Performed by: OPHTHALMOLOGY

## 2023-07-14 ASSESSMENT — KERATOMETRY
OS_K1POWER_DIOPTERS: 42.50
OS_K2POWER_DIOPTERS: 43.25
OD_K1POWER_DIOPTERS: 42.75
OD_AXISANGLE_DEGREES: 078
OD_K2POWER_DIOPTERS: 43.75
OS_AXISANGLE_DEGREES: 082

## 2023-07-14 ASSESSMENT — SPHEQUIV_DERIVED
OS_SPHEQUIV: 1.25
OD_SPHEQUIV: 1.125
OS_SPHEQUIV: 0.375
OD_SPHEQUIV: 0.125

## 2023-07-14 ASSESSMENT — REFRACTION_MANIFEST
OD_VA2: 20/25-2
OS_ADD: +2.75
OD_ADD: +2.75
OS_VA2: 20/30+2
OS_CYLINDER: -0.75
OD_SPHERE: +0.50
OS_AXIS: 085
OS_VA1: 20/30+2
OS_SPHERE: +0.75
OD_AXIS: 105
OD_VA1: 20/25-2
OD_CYLINDER: -0.75

## 2023-07-14 ASSESSMENT — CORNEAL DYSTROPHY - POSTERIOR
OS_POSTERIORDYSTROPHY: T GUTTATA
OD_POSTERIORDYSTROPHY: T GUTTATA

## 2023-07-14 ASSESSMENT — REFRACTION_CURRENTRX
OS_CYLINDER: -0.50
OS_OVR_VA: 20/
OD_AXIS: 106
OD_VPRISM_DIRECTION: BF
OS_ADD: +2.50
OD_SPHERE: +1.25
OS_VPRISM_DIRECTION: BF
OD_OVR_VA: 20/
OD_CYLINDER: -0.75
OD_ADD: +2.50
OS_SPHERE: +1.25
OS_AXIS: 074

## 2023-07-14 ASSESSMENT — REFRACTION_AUTOREFRACTION
OD_SPHERE: +1.75
OD_AXIS: 108
OS_AXIS: 069
OS_SPHERE: +1.75
OS_CYLINDER: -1.00
OD_CYLINDER: -1.25

## 2023-07-14 ASSESSMENT — AXIALLENGTH_DERIVED
OD_AL: 23.2504
OS_AL: 23.3372
OD_AL: 23.6351
OS_AL: 23.6757

## 2023-07-14 ASSESSMENT — LID EXAM ASSESSMENTS
OD_BLEPHARITIS: RLL RUL T
OS_BLEPHARITIS: LLL LUL T
OS_COMMENTS: 1+ MGD
OD_COMMENTS: 1+ MGD

## 2023-07-14 ASSESSMENT — CONFRONTATIONAL VISUAL FIELD TEST (CVF)
OS_FINDINGS: FULL
OD_FINDINGS: FULL

## 2023-07-14 ASSESSMENT — VISUAL ACUITY
OS_BCVA: 20/25+2
OD_BCVA: 20/30+2

## 2023-07-14 ASSESSMENT — PUNCTA - ASSESSMENT
OD_PUNCTA: RLL MED
OS_PUNCTA: LLL MED
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
## 2023-08-02 ENCOUNTER — OPTICAL OFFICE (OUTPATIENT)
Dept: URBAN - NONMETROPOLITAN AREA CLINIC 4 | Facility: CLINIC | Age: 63
Setting detail: OPHTHALMOLOGY
End: 2023-08-02

## 2023-08-02 DIAGNOSIS — H52.03: ICD-10-CM

## 2023-08-02 PROCEDURE — V2799T TAXABLE VISION SERVICE MISCELLANEOUS: Performed by: OPTOMETRIST

## 2023-08-07 ENCOUNTER — DOCTOR'S OFFICE (OUTPATIENT)
Dept: URBAN - NONMETROPOLITAN AREA CLINIC 1 | Facility: CLINIC | Age: 63
Setting detail: OPHTHALMOLOGY
End: 2023-08-07
Payer: COMMERCIAL

## 2023-08-07 DIAGNOSIS — H40.013: ICD-10-CM

## 2023-08-07 DIAGNOSIS — H10.13: ICD-10-CM

## 2023-08-07 DIAGNOSIS — H18.793: ICD-10-CM

## 2023-08-07 DIAGNOSIS — E11.9: ICD-10-CM

## 2023-08-07 DIAGNOSIS — H35.373: ICD-10-CM

## 2023-08-07 DIAGNOSIS — H25.13: ICD-10-CM

## 2023-08-07 DIAGNOSIS — H04.123: ICD-10-CM

## 2023-08-07 PROCEDURE — 76514 ECHO EXAM OF EYE THICKNESS: CPT | Performed by: OPHTHALMOLOGY

## 2023-08-07 PROCEDURE — 92014 COMPRE OPH EXAM EST PT 1/>: CPT | Performed by: OPHTHALMOLOGY

## 2023-08-07 PROCEDURE — 92133 CPTRZD OPH DX IMG PST SGM ON: CPT | Performed by: OPHTHALMOLOGY

## 2023-08-07 ASSESSMENT — REFRACTION_CURRENTRX
OS_ADD: +2.50
OD_AXIS: 106
OD_SPHERE: +1.25
OS_AXIS: 074
OD_CYLINDER: -0.75
OS_OVR_VA: 20/
OS_SPHERE: +1.25
OS_CYLINDER: -0.50
OD_VPRISM_DIRECTION: BF
OD_ADD: +2.50
OD_OVR_VA: 20/
OS_VPRISM_DIRECTION: BF

## 2023-08-07 ASSESSMENT — REFRACTION_AUTOREFRACTION
OD_SPHERE: +1.75
OS_CYLINDER: -0.75
OD_CYLINDER: -1.25
OS_AXIS: 102
OD_AXIS: 104
OS_SPHERE: +1.75

## 2023-08-07 ASSESSMENT — PACHYMETRY
OD_CT_CORRECTION: 1
OS_CT_CORRECTION: 1
OS_CT_UM: 530
OD_CT_UM: 538

## 2023-08-07 ASSESSMENT — REFRACTION_MANIFEST
OD_VA2: 20/25-2
OS_SPHERE: +0.75
OS_VA2: 20/30+2
OS_CYLINDER: -0.75
OD_ADD: +2.75
OS_ADD: +2.75
OS_VA1: 20/30+2
OD_VA1: 20/25-2
OD_SPHERE: +0.50
OD_CYLINDER: -0.75
OS_AXIS: 085
OD_AXIS: 105

## 2023-08-07 ASSESSMENT — PUNCTA - ASSESSMENT
OS_PUNCTA: LLL MED
OD_PUNCTA: RLL MED

## 2023-08-07 ASSESSMENT — CONFRONTATIONAL VISUAL FIELD TEST (CVF)
OD_FINDINGS: FULL
OS_FINDINGS: FULL

## 2023-08-07 ASSESSMENT — KERATOMETRY
OS_K1POWER_DIOPTERS: 43.00
OS_K2POWER_DIOPTERS: 43.75
OD_K2POWER_DIOPTERS: 43.75
OD_K1POWER_DIOPTERS: 43.00
OS_AXISANGLE_DEGREES: 095
OD_AXISANGLE_DEGREES: 081

## 2023-08-07 ASSESSMENT — SPHEQUIV_DERIVED
OS_SPHEQUIV: 1.375
OD_SPHEQUIV: 0.125
OD_SPHEQUIV: 1.125
OS_SPHEQUIV: 0.375

## 2023-08-07 ASSESSMENT — LID EXAM ASSESSMENTS
OD_BLEPHARITIS: T
OS_BLEPHARITIS: T

## 2023-08-07 ASSESSMENT — AXIALLENGTH_DERIVED
OD_AL: 23.2059
OS_AL: 23.1121
OD_AL: 23.5891
OS_AL: 23.4921

## 2023-08-07 ASSESSMENT — TONOMETRY
OD_IOP_MMHG: 12
OS_IOP_MMHG: 16

## 2023-08-07 ASSESSMENT — CORNEAL DYSTROPHY - POSTERIOR
OD_POSTERIORDYSTROPHY: T GUTTATA
OS_POSTERIORDYSTROPHY: T GUTTATA

## 2023-08-07 ASSESSMENT — VISUAL ACUITY
OS_BCVA: 20/25+2
OD_BCVA: 20/25-1

## 2023-08-09 ENCOUNTER — OPTICAL OFFICE (OUTPATIENT)
Dept: URBAN - NONMETROPOLITAN AREA CLINIC 4 | Facility: CLINIC | Age: 63
Setting detail: OPHTHALMOLOGY
End: 2023-08-09
Payer: COMMERCIAL

## 2023-08-09 ENCOUNTER — RX ONLY (RX ONLY)
Age: 63
End: 2023-08-09

## 2023-08-09 ENCOUNTER — DOCTOR'S OFFICE (OUTPATIENT)
Dept: URBAN - NONMETROPOLITAN AREA CLINIC 1 | Facility: CLINIC | Age: 63
Setting detail: OPHTHALMOLOGY
End: 2023-08-09
Payer: COMMERCIAL

## 2023-08-09 DIAGNOSIS — H52.03: ICD-10-CM

## 2023-08-09 DIAGNOSIS — H52.4: ICD-10-CM

## 2023-08-09 PROBLEM — H18.793: Status: ACTIVE | Noted: 2023-08-07

## 2023-08-09 PROBLEM — H35.373 ERM; BOTH EYES: Status: ACTIVE | Noted: 2023-08-07

## 2023-08-09 PROCEDURE — V2020 VISION SVCS FRAMES PURCHASES: HCPCS | Performed by: OPTOMETRIST

## 2023-08-09 PROCEDURE — 92015 DETERMINE REFRACTIVE STATE: CPT | Performed by: OPTOMETRIST

## 2023-08-09 PROCEDURE — 92012 INTRM OPH EXAM EST PATIENT: CPT | Performed by: OPTOMETRIST

## 2023-08-09 PROCEDURE — V2744 TINT PHOTOCHROMATIC LENS/ES: HCPCS | Performed by: OPTOMETRIST

## 2023-08-09 PROCEDURE — V2203 LENS SPHCYL BIFOCAL 4.00D/.1: HCPCS | Performed by: OPTOMETRIST

## 2023-08-09 ASSESSMENT — REFRACTION_CURRENTRX
OS_SPHERE: +0.75
OS_AXIS: 090
OS_OVR_VA: 20/
OD_AXIS: 100
OD_ADD: +2.75
OD_OVR_VA: 20/
OS_VPRISM_DIRECTION: BF
OD_SPHERE: +0.50
OS_CYLINDER: -0.75
OD_CYLINDER: -0.75
OD_VPRISM_DIRECTION: BF
OS_ADD: +2.75

## 2023-08-09 ASSESSMENT — REFRACTION_MANIFEST
OS_AXIS: 085
OS_ADD: +2.75
OD_SPHERE: +0.75
OD_VA2: 20/25
OS_VA1: 20/25-2
OS_VA2: 20/25-2
OS_CYLINDER: -0.75
OD_ADD: +2.75
OS_SPHERE: +0.75
OD_CYLINDER: -0.75
OD_VA1: 20/25
OD_AXIS: 105

## 2023-08-09 ASSESSMENT — KERATOMETRY
OD_K1POWER_DIOPTERS: 43.00
OS_AXISANGLE_DEGREES: 095
OD_K2POWER_DIOPTERS: 43.75
OS_K2POWER_DIOPTERS: 43.75
OD_AXISANGLE_DEGREES: 081
OS_K1POWER_DIOPTERS: 43.00

## 2023-08-09 ASSESSMENT — PUNCTA - ASSESSMENT
OS_PUNCTA: LLL MED
OD_PUNCTA: RLL MED

## 2023-08-09 ASSESSMENT — VISUAL ACUITY
OD_BCVA: 20/25-2
OS_BCVA: 20/20-1

## 2023-08-09 ASSESSMENT — AXIALLENGTH_DERIVED
OS_AL: 23.4921
OS_AL: 23.2531
OD_AL: 23.3006
OD_AL: 23.4921

## 2023-08-09 ASSESSMENT — SPHEQUIV_DERIVED
OD_SPHEQUIV: 0.875
OD_SPHEQUIV: 0.375
OS_SPHEQUIV: 0.375
OS_SPHEQUIV: 1

## 2023-08-09 ASSESSMENT — REFRACTION_AUTOREFRACTION
OS_CYLINDER: 0.00
OD_AXIS: 096
OD_CYLINDER: -0.75
OD_SPHERE: +1.25
OS_AXIS: 0
OS_SPHERE: +1.00

## 2023-08-09 ASSESSMENT — CORNEAL DYSTROPHY - POSTERIOR
OS_POSTERIORDYSTROPHY: T GUTTATA
OD_POSTERIORDYSTROPHY: T GUTTATA

## 2023-08-09 ASSESSMENT — CONFRONTATIONAL VISUAL FIELD TEST (CVF)
OD_FINDINGS: FULL
OS_FINDINGS: FULL

## 2023-08-09 ASSESSMENT — LID EXAM ASSESSMENTS
OD_BLEPHARITIS: T
OS_BLEPHARITIS: T

## 2023-09-26 ENCOUNTER — HOSPITAL ENCOUNTER (EMERGENCY)
Facility: HOSPITAL | Age: 63
Discharge: HOME | End: 2023-09-26
Attending: STUDENT IN AN ORGANIZED HEALTH CARE EDUCATION/TRAINING PROGRAM | Admitting: STUDENT IN AN ORGANIZED HEALTH CARE EDUCATION/TRAINING PROGRAM
Payer: COMMERCIAL

## 2023-09-26 ENCOUNTER — APPOINTMENT (EMERGENCY)
Dept: CT IMAGING | Facility: HOSPITAL | Age: 63
End: 2023-09-26
Payer: COMMERCIAL

## 2023-09-26 VITALS
OXYGEN SATURATION: 98 % | RESPIRATION RATE: 16 BRPM | WEIGHT: 196 LBS | SYSTOLIC BLOOD PRESSURE: 116 MMHG | BODY MASS INDEX: 29.7 KG/M2 | HEART RATE: 76 BPM | TEMPERATURE: 98 F | HEIGHT: 68 IN | DIASTOLIC BLOOD PRESSURE: 77 MMHG

## 2023-09-26 DIAGNOSIS — R19.7 DIARRHEA: Primary | ICD-10-CM

## 2023-09-26 LAB
ALBUMIN SERPL BCP-MCNC: 4.3 G/DL (ref 3.5–5)
ALP SERPL-CCNC: 50 U/L (ref 34–104)
ALT SERPL W P-5'-P-CCNC: 11 U/L (ref 7–52)
ANION GAP SERPL CALCULATED.3IONS-SCNC: 8 MMOL/L
AST SERPL W P-5'-P-CCNC: 11 U/L (ref 13–39)
BACTERIA UR QL AUTO: NORMAL /HPF
BASOPHILS # BLD AUTO: 0.06 THOUSANDS/ÂΜL (ref 0–0.1)
BASOPHILS NFR BLD AUTO: 1 % (ref 0–1)
BILIRUB SERPL-MCNC: 0.28 MG/DL (ref 0.2–1)
BILIRUB UR QL STRIP: NEGATIVE
BUN SERPL-MCNC: 12 MG/DL (ref 5–25)
CALCIUM SERPL-MCNC: 8.9 MG/DL (ref 8.4–10.2)
CHLORIDE SERPL-SCNC: 101 MMOL/L (ref 96–108)
CLARITY UR: CLEAR
CO2 SERPL-SCNC: 27 MMOL/L (ref 21–32)
COLOR UR: YELLOW
CREAT SERPL-MCNC: 0.98 MG/DL (ref 0.6–1.3)
EOSINOPHIL # BLD AUTO: 0.08 THOUSAND/ÂΜL (ref 0–0.61)
EOSINOPHIL NFR BLD AUTO: 1 % (ref 0–6)
ERYTHROCYTE [DISTWIDTH] IN BLOOD BY AUTOMATED COUNT: 12.9 % (ref 11.6–15.1)
GFR SERPL CREATININE-BSD FRML MDRD: 81 ML/MIN/1.73SQ M
GLUCOSE SERPL-MCNC: 182 MG/DL (ref 65–140)
GLUCOSE UR STRIP-MCNC: NEGATIVE MG/DL
HCT VFR BLD AUTO: 40.5 % (ref 36.5–49.3)
HGB BLD-MCNC: 13.5 G/DL (ref 12–17)
HGB UR QL STRIP.AUTO: NEGATIVE
IMM GRANULOCYTES # BLD AUTO: 0.06 THOUSAND/UL (ref 0–0.2)
IMM GRANULOCYTES NFR BLD AUTO: 1 % (ref 0–2)
KETONES UR STRIP-MCNC: NEGATIVE MG/DL
LACTATE SERPL-SCNC: 1.3 MMOL/L (ref 0.5–2)
LEUKOCYTE ESTERASE UR QL STRIP: ABNORMAL
LIPASE SERPL-CCNC: 54 U/L (ref 11–82)
LYMPHOCYTES # BLD AUTO: 2.42 THOUSANDS/ÂΜL (ref 0.6–4.47)
LYMPHOCYTES NFR BLD AUTO: 38 % (ref 14–44)
MCH RBC QN AUTO: 30.3 PG (ref 26.8–34.3)
MCHC RBC AUTO-ENTMCNC: 33.3 G/DL (ref 31.4–37.4)
MCV RBC AUTO: 91 FL (ref 82–98)
MONOCYTES # BLD AUTO: 0.5 THOUSAND/ÂΜL (ref 0.17–1.22)
MONOCYTES NFR BLD AUTO: 8 % (ref 4–12)
NEUTROPHILS # BLD AUTO: 3.32 THOUSANDS/ÂΜL (ref 1.85–7.62)
NEUTS SEG NFR BLD AUTO: 51 % (ref 43–75)
NITRITE UR QL STRIP: NEGATIVE
NON-SQ EPI CELLS URNS QL MICRO: NORMAL /HPF
NRBC BLD AUTO-RTO: 0 /100 WBCS
PH UR STRIP.AUTO: 5.5 [PH]
PLATELET # BLD AUTO: 231 THOUSANDS/UL (ref 149–390)
PMV BLD AUTO: 8.9 FL (ref 8.9–12.7)
POTASSIUM SERPL-SCNC: 3.7 MMOL/L (ref 3.5–5.3)
PROT SERPL-MCNC: 7 G/DL (ref 6.4–8.4)
PROT UR STRIP-MCNC: NEGATIVE MG/DL
RBC # BLD AUTO: 4.45 MILLION/UL (ref 3.88–5.62)
RBC #/AREA URNS AUTO: NORMAL /HPF
SODIUM SERPL-SCNC: 136 MMOL/L (ref 135–147)
SP GR UR STRIP.AUTO: 1.01 (ref 1–1.03)
UROBILINOGEN UR QL STRIP.AUTO: 0.2 E.U./DL
WBC # BLD AUTO: 6.44 THOUSAND/UL (ref 4.31–10.16)
WBC #/AREA URNS AUTO: NORMAL /HPF

## 2023-09-26 PROCEDURE — 96374 THER/PROPH/DIAG INJ IV PUSH: CPT

## 2023-09-26 PROCEDURE — 83690 ASSAY OF LIPASE: CPT | Performed by: PHYSICIAN ASSISTANT

## 2023-09-26 PROCEDURE — 96361 HYDRATE IV INFUSION ADD-ON: CPT

## 2023-09-26 PROCEDURE — G1004 CDSM NDSC: HCPCS

## 2023-09-26 PROCEDURE — 36415 COLL VENOUS BLD VENIPUNCTURE: CPT | Performed by: PHYSICIAN ASSISTANT

## 2023-09-26 PROCEDURE — 99285 EMERGENCY DEPT VISIT HI MDM: CPT | Performed by: PHYSICIAN ASSISTANT

## 2023-09-26 PROCEDURE — 83605 ASSAY OF LACTIC ACID: CPT | Performed by: PHYSICIAN ASSISTANT

## 2023-09-26 PROCEDURE — 74177 CT ABD & PELVIS W/CONTRAST: CPT

## 2023-09-26 PROCEDURE — 80053 COMPREHEN METABOLIC PANEL: CPT | Performed by: PHYSICIAN ASSISTANT

## 2023-09-26 PROCEDURE — 85025 COMPLETE CBC W/AUTO DIFF WBC: CPT | Performed by: PHYSICIAN ASSISTANT

## 2023-09-26 PROCEDURE — 81001 URINALYSIS AUTO W/SCOPE: CPT | Performed by: PHYSICIAN ASSISTANT

## 2023-09-26 PROCEDURE — 99284 EMERGENCY DEPT VISIT MOD MDM: CPT

## 2023-09-26 RX ORDER — KETOROLAC TROMETHAMINE 30 MG/ML
15 INJECTION, SOLUTION INTRAMUSCULAR; INTRAVENOUS ONCE
Status: COMPLETED | OUTPATIENT
Start: 2023-09-26 | End: 2023-09-26

## 2023-09-26 RX ADMIN — IOHEXOL 100 ML: 350 INJECTION, SOLUTION INTRAVENOUS at 20:22

## 2023-09-26 RX ADMIN — SODIUM CHLORIDE 1000 ML: 0.9 INJECTION, SOLUTION INTRAVENOUS at 19:21

## 2023-09-26 RX ADMIN — KETOROLAC TROMETHAMINE 15 MG: 30 INJECTION, SOLUTION INTRAMUSCULAR; INTRAVENOUS at 19:24

## 2023-09-26 NOTE — ED PROVIDER NOTES
History  Chief Complaint   Patient presents with   • Abdominal Pain     Generalized abdominal pain w/ diarrhea xfew days     42-year-old male presents the emergency department for evaluation of generalized abdominal pain with diarrhea for the last several days. He denies nausea or vomiting. Reports decreased appetite. He denies any point tenderness in the abdomen. Denies any flank or back pain. Denies dysuria hematuria urinary frequency. Patient reports he had similar previous and was diagnosed with a "stomach bug."  Patient also reports history of pancreatitis. Prior to Admission Medications   Prescriptions Last Dose Informant Patient Reported? Taking? BD Pen Needle Micro U/F 32G X 6 MM MISC   Yes No   Sig: OZEMPIC PEN NEEDLES INJECT WEEKLY   Cholecalciferol 25 MCG (1000 UT) CHEW   Yes No   Creon 37541-101288 units CPEP   Yes No   Sig: TAKE 2 CAPS BY MOUTH WITH EACH MEAL AND 1 CAP WITH SNACKS   Easy Touch Pen Needles 31G X 8 MM MISC   Yes No   Empagliflozin (JARDIANCE PO)   Yes No   Patient not taking: Reported on 6/28/2023   Lactobacillus TABS   Yes No   Sig: Take by mouth   Lancets (OneTouch Delica Plus TAULLX01L) MISC   Yes No   Sig: TEST TWICE A DAY   MAGNESIUM PO   Yes No   OneTouch Delica Lancets 15Y MISC   Yes No   OneTouch Ultra test strip   Yes No   Sig: TEST TWICE A DAY   Semaglutide (OZEMPIC, 1 MG/DOSE, SC)   Yes No   Silodosin 4 MG CAPS   Yes No   Sig: Take 1 capsule by mouth daily   Silodosin 8 MG CAPS   Yes No   Sig: Take by mouth   apixaban (ELIQUIS) 5 mg   Yes No   Sig: Take 5 mg by mouth 2 (two) times a day   dexlansoprazole (DEXILANT) 60 MG capsule   Yes No   Sig: Take 1 Cap by mouth daily.    ergocalciferol (ERGOCALCIFEROL) 1.25 MG (13811 UT) capsule   Yes No   Sig: Take 50,000 Units by mouth   furosemide (LASIX) 20 mg tablet   Yes No   Sig: Take by mouth every 3 (three) days   insulin glargine (LANTUS) 100 units/mL subcutaneous injection   Yes No   Sig: Inject 25 Units under the skin 15 in morning and 10 at night   levothyroxine 50 mcg tablet   Yes No   Sig: Take 50 mcg by mouth daily   lisinopril (ZESTRIL) 10 mg tablet   Yes No   loratadine (CLARITIN) 10 mg tablet   Yes No   Sig: Take by mouth   magnesium (MAGTAB) 84 MG (7MEQ) TBCR   No No   Sig: Take 1 tablet (84 mg total) by mouth daily for 5 days   Patient not taking: Reported on 6/28/2023   meclizine (ANTIVERT) 25 mg tablet   Yes No   Sig: Take by mouth   niacin 500 mg tablet   Yes No   Patient not taking: Reported on 6/28/2023   oxyCODONE (ROXICODONE) 15 mg immediate release tablet   Yes No   rOPINIRole (REQUIP) 4 mg tablet   Yes No   Sig: Take 4 mg by mouth daily   simvastatin (ZOCOR) 40 mg tablet   Yes No   Sig: Take by mouth   tamsulosin (FLOMAX) 0.4 mg   Yes No   Sig: Take by mouth   venlafaxine (EFFEXOR-XR) 150 mg 24 hr capsule   Yes No   Sig: Take by mouth      Facility-Administered Medications: None       Past Medical History:   Diagnosis Date   • Acute renal failure (ARF) (HCC)    • BPH (benign prostatic hyperplasia)    • Chronic pancreatitis ([address])    • Diabetes mellitus ([address])    • Disease of thyroid gland    • GERD (gastroesophageal reflux disease)    • High cholesterol    • Hypertension    • IBS (irritable bowel syndrome)    • Obstructive jaundice    • Pancreatic necrosis        Past Surgical History:   Procedure Laterality Date   • ABDOMINAL SURGERY     • CHOLECYSTECTOMY     • PANCREATIC CYST DRAINAGE         Family History   Problem Relation Age of Onset   • No Known Problems Mother    • Cancer Father      I have reviewed and agree with the history as documented. E-Cigarette/Vaping   • E-Cigarette Use Never User      E-Cigarette/Vaping Substances     Social History     Tobacco Use   • Smoking status: Never   • Smokeless tobacco: Never   Vaping Use   • Vaping Use: Never used   Substance Use Topics   • Alcohol use: Never   • Drug use: Never       Review of Systems   Constitutional: Negative.   Negative for appetite change, fatigue and fever. Respiratory: Negative. Cardiovascular: Negative. Gastrointestinal: Positive for abdominal pain and diarrhea. Negative for nausea and vomiting. Genitourinary: Negative. Musculoskeletal: Negative. Skin: Negative. Neurological: Negative. All other systems reviewed and are negative. Physical Exam  Physical Exam  Vitals and nursing note reviewed. Constitutional:       General: He is not in acute distress. Appearance: He is well-developed. He is not ill-appearing, toxic-appearing or diaphoretic. HENT:      Head: Normocephalic. Mouth/Throat:      Mouth: Mucous membranes are moist.   Eyes:      Extraocular Movements: Extraocular movements intact. Cardiovascular:      Rate and Rhythm: Normal rate and regular rhythm. Pulmonary:      Effort: Pulmonary effort is normal.   Abdominal:      General: Abdomen is flat. Palpations: Abdomen is soft. Tenderness: There is generalized abdominal tenderness. There is no right CVA tenderness or left CVA tenderness. Skin:     General: Skin is warm and dry. Findings: No rash. Neurological:      General: No focal deficit present. Mental Status: He is alert and oriented to person, place, and time.    Psychiatric:         Mood and Affect: Mood normal.         Vital Signs  ED Triage Vitals [09/26/23 1909]   Temperature Pulse Respirations Blood Pressure SpO2   98 °F (36.7 °C) 76 16 116/77 98 %      Temp Source Heart Rate Source Patient Position - Orthostatic VS BP Location FiO2 (%)   Tympanic Monitor Sitting Right arm --      Pain Score       4           Vitals:    09/26/23 1909   BP: 116/77   Pulse: 76   Patient Position - Orthostatic VS: Sitting         Visual Acuity      ED Medications  Medications   sodium chloride 0.9 % bolus 1,000 mL (1,000 mL Intravenous New Bag 9/26/23 1921)   ketorolac (TORADOL) injection 15 mg (15 mg Intravenous Given 9/26/23 1924)   iohexol (OMNIPAQUE) 350 MG/ML injection (MULTI-DOSE) 100 mL (100 mL Intravenous Given 9/26/23 2022)       Diagnostic Studies  Results Reviewed     Procedure Component Value Units Date/Time    Lactic acid, plasma (w/reflex if result > 2.0) [[id number]]  (Normal) Collected: 09/26/23 1919    Lab Status: Final result Specimen: Blood from Arm, Right Updated: 09/26/23 1952     LACTIC ACID 1.3 mmol/L     Narrative:      Result may be elevated if tourniquet was used during collection.     Comprehensive metabolic panel [[id number]]  (Abnormal) Collected: 09/26/23 1919    Lab Status: Final result Specimen: Blood from Arm, Right Updated: 09/26/23 1952     Sodium 136 mmol/L      Potassium 3.7 mmol/L      Chloride 101 mmol/L      CO2 27 mmol/L      ANION GAP 8 mmol/L      BUN 12 mg/dL      Creatinine 0.98 mg/dL      Glucose 182 mg/dL      Calcium 8.9 mg/dL      AST 11 U/L      ALT 11 U/L      Alkaline Phosphatase 50 U/L      Total Protein 7.0 g/dL      Albumin 4.3 g/dL      Total Bilirubin 0.28 mg/dL      eGFR 81 ml/min/1.73sq m     Narrative:      [place] guidelines for Chronic Kidney Disease (CKD):   •  Stage 1 with normal or high GFR (GFR > 90 mL/min/1.73 square meters)  •  Stage 2 Mild CKD (GFR = 60-89 mL/min/1.73 square meters)  •  Stage 3A Moderate CKD (GFR = 45-59 mL/min/1.73 square meters)  •  Stage 3B Moderate CKD (GFR = 30-44 mL/min/1.73 square meters)  •  Stage 4 Severe CKD (GFR = 15-29 mL/min/1.73 square meters)  •  Stage 5 End Stage CKD (GFR <15 mL/min/1.73 square meters)  Note: GFR calculation is accurate only with a steady state creatinine    Lipase [[id number]]  (Normal) Collected: 09/26/23 1919    Lab Status: Final result Specimen: Blood from Arm, Right Updated: 09/26/23 1952     Lipase 54 u/L     Urine Microscopic [[id number]]  (Normal) Collected: 09/26/23 1935    Lab Status: Final result Specimen: Urine, Clean Catch Updated: 09/26/23 1950     RBC, UA None Seen /hpf      WBC, UA 2-4 /hpf      Epithelial Cells Occasional /hpf      Bacteria, UA None Seen /hpf     UA (URINE) with reflex to Scope [[id number]]  (Abnormal) Collected: 09/26/23 1935    Lab Status: Final result Specimen: Urine, Clean Catch Updated: 09/26/23 1939     Color, UA Yellow     Clarity, UA Clear     Specific Gravity, UA 1.010     pH, UA 5.5     Leukocytes, UA Small     Nitrite, UA Negative     Protein, UA Negative mg/dl      Glucose, UA Negative mg/dl      Ketones, UA Negative mg/dl      Urobilinogen, UA 0.2 E.U./dl      Bilirubin, UA Negative     Occult Blood, UA Negative    CBC and differential [[id number]] Collected: 09/26/23 1919    Lab Status: Final result Specimen: Blood from Arm, Right Updated: 09/26/23 1928     WBC 6.44 Thousand/uL      RBC 4.45 Million/uL      Hemoglobin 13.5 g/dL      Hematocrit 40.5 %      MCV 91 fL      MCH 30.3 pg      MCHC 33.3 g/dL      RDW 12.9 %      MPV 8.9 fL      Platelets 013 Thousands/uL      nRBC 0 /100 WBCs      Neutrophils Relative 51 %      Immat GRANS % 1 %      Lymphocytes Relative 38 %      Monocytes Relative 8 %      Eosinophils Relative 1 %      Basophils Relative 1 %      Neutrophils Absolute 3.32 Thousands/µL      Immature Grans Absolute 0.06 Thousand/uL      Lymphocytes Absolute 2.42 Thousands/µL      Monocytes Absolute 0.50 Thousand/µL      Eosinophils Absolute 0.08 Thousand/µL      Basophils Absolute 0.06 Thousands/µL                  CT abdomen pelvis with contrast   Final Result by Rufina Hayes MD (09/26 2103)      No evidence of acute intra-abdominal or pelvic pathology            Workstation performed: [id number]                    Procedures  Procedures         ED Course  ED Course as of 09/26/23 2105   Tue Sep 26, 2023   1940 WBC: 6.44   2026 LACTIC ACID: 1.3   2026 Leukocytes, UA(!): Small  UA with small leuks however nitrate negative, no bacteria. 2-4 WBC.  Patient denies dysuria or hematuria denies urinary frequency, lower concern for UTI at this time   2104 CT: No evidence of acute intra-abdominal or pelvic pathology   2105 I discussed results and findings with the patient. We discussed symptomatic treatment and return precautions patient verbalized understanding. He was clinically and hemodynamically stable for discharge               SBIRT 20yo+    Flowsheet Row Most Recent Value   Initial Alcohol Screen: US AUDIT-C     1. How often do you have a drink containing alcohol? 0 Filed at: 09/26/2023 1909   2. How many drinks containing alcohol do you have on a typical day you are drinking? 0 Filed at: 09/26/2023 1909   3a. Male UNDER 65: How often do you have five or more drinks on one occasion? 0 Filed at: 09/26/2023 1909   3b. FEMALE Any Age, or MALE 65+: How often do you have 4 or more drinks on one occassion? 0 Filed at: 09/26/2023 1909   Audit-C Score 0 Filed at: 09/26/2023 1909   DAYAN: How many times in the past year have you. .. Used an illegal drug or used a prescription medication for non-medical reasons? Never Filed at: 09/26/2023 9601 [facility] Making  57-year-old male presented to the emergency department for evaluation of abdominal pain and diarrhea. Patient vitals and medical record reviewed. Patient at risk for the following but not limited to gastroenteritis, gastritis, enteritis, pancreatitis. Laboratory findings relatively unremarkable. CT scan negative for acute intra-abdominal abnormality. We discussed results and findings, symptomatic care at home and return precautions and patient verbalized understanding. He was clinically and hemodynamically stable for discharge    Diarrhea: acute illness or injury  Amount and/or Complexity of Data Reviewed  Labs: ordered. Decision-making details documented in ED Course. Radiology: ordered. Risk  Prescription drug management.           Disposition  Final diagnoses:   Diarrhea     Time reflects when diagnosis was documented in both MDM as applicable and the Disposition within this note     Time User Action Codes Description Comment 9/26/2023  8:49 PM Ksenia Washington Add [R19.7] Diarrhea       ED Disposition     ED Disposition   Discharge    Condition   Stable    Date/Time   Tue Sep 26, 2023  9:04 PM    [address] discharge to home/self care. Follow-up Information     Follow up With Specialties Details Why [address] [facility], DO Internal Medicine   [address] [facility] [address]  [phone]            Patient's Medications   Discharge Prescriptions    No medications on file       No discharge procedures on file.     PDMP Review     None          ED Provider  Electronically Signed by           Ksenia Washington PA-C  09/26/23 0854

## 2023-09-27 NOTE — DISCHARGE INSTRUCTIONS
Please stay well-hydrated. Trial small frequent meals. I recommend a brat diet. Make a follow-up appoint with your family doctor.   Please return with any new or worsening symptoms

## 2024-02-14 ENCOUNTER — DOCTOR'S OFFICE (OUTPATIENT)
Dept: URBAN - NONMETROPOLITAN AREA CLINIC 1 | Facility: CLINIC | Age: 64
Setting detail: OPHTHALMOLOGY
End: 2024-02-14
Payer: COMMERCIAL

## 2024-02-14 DIAGNOSIS — H04.123: ICD-10-CM

## 2024-02-14 DIAGNOSIS — H40.013: ICD-10-CM

## 2024-02-14 DIAGNOSIS — H21.233: ICD-10-CM

## 2024-02-14 DIAGNOSIS — H18.793: ICD-10-CM

## 2024-02-14 DIAGNOSIS — H10.13: ICD-10-CM

## 2024-02-14 PROCEDURE — 92083 EXTENDED VISUAL FIELD XM: CPT | Performed by: OPHTHALMOLOGY

## 2024-02-14 PROCEDURE — 99213 OFFICE O/P EST LOW 20 MIN: CPT | Performed by: OPHTHALMOLOGY

## 2024-02-14 PROCEDURE — 92020 GONIOSCOPY: CPT | Performed by: OPHTHALMOLOGY

## 2024-02-14 ASSESSMENT — LID EXAM ASSESSMENTS
OS_BLEPHARITIS: T
OD_BLEPHARITIS: T

## 2024-02-14 ASSESSMENT — CONFRONTATIONAL VISUAL FIELD TEST (CVF)
OS_FINDINGS: FULL
OD_FINDINGS: FULL

## 2024-03-21 ENCOUNTER — HOSPITAL ENCOUNTER (EMERGENCY)
Facility: HOSPITAL | Age: 64
Discharge: HOME | End: 2024-03-21
Attending: EMERGENCY MEDICINE
Payer: COMMERCIAL

## 2024-03-21 VITALS
BODY MASS INDEX: 29.63 KG/M2 | SYSTOLIC BLOOD PRESSURE: 134 MMHG | DIASTOLIC BLOOD PRESSURE: 82 MMHG | RESPIRATION RATE: 16 BRPM | OXYGEN SATURATION: 100 % | HEART RATE: 69 BPM | WEIGHT: 194.89 LBS | TEMPERATURE: 97.2 F

## 2024-03-21 DIAGNOSIS — R10.9 ABDOMINAL PAIN: Primary | ICD-10-CM

## 2024-03-21 DIAGNOSIS — R11.0 NAUSEA: ICD-10-CM

## 2024-03-21 LAB
ALBUMIN SERPL BCP-MCNC: 4.3 G/DL (ref 3.5–5)
ALP SERPL-CCNC: 60 U/L (ref 34–104)
ALT SERPL W P-5'-P-CCNC: 37 U/L (ref 7–52)
ANION GAP SERPL CALCULATED.3IONS-SCNC: 7 MMOL/L (ref 4–13)
AST SERPL W P-5'-P-CCNC: 22 U/L (ref 13–39)
BASOPHILS # BLD AUTO: 0.05 THOUSANDS/ÂΜL (ref 0–0.1)
BASOPHILS NFR BLD AUTO: 1 % (ref 0–1)
BILIRUB SERPL-MCNC: 0.49 MG/DL (ref 0.2–1)
BILIRUB UR QL STRIP: NEGATIVE
BUN SERPL-MCNC: 15 MG/DL (ref 5–25)
CALCIUM SERPL-MCNC: 9.5 MG/DL (ref 8.4–10.2)
CHLORIDE SERPL-SCNC: 100 MMOL/L (ref 96–108)
CLARITY UR: CLEAR
CO2 SERPL-SCNC: 28 MMOL/L (ref 21–32)
COLOR UR: YELLOW
CREAT SERPL-MCNC: 0.9 MG/DL (ref 0.6–1.3)
EOSINOPHIL # BLD AUTO: 0.07 THOUSAND/ÂΜL (ref 0–0.61)
EOSINOPHIL NFR BLD AUTO: 1 % (ref 0–6)
ERYTHROCYTE [DISTWIDTH] IN BLOOD BY AUTOMATED COUNT: 12.8 % (ref 11.6–15.1)
GFR SERPL CREATININE-BSD FRML MDRD: 89 ML/MIN/1.73SQ M
GLUCOSE SERPL-MCNC: 232 MG/DL (ref 65–140)
GLUCOSE UR STRIP-MCNC: ABNORMAL MG/DL
HCT VFR BLD AUTO: 41 % (ref 36.5–49.3)
HGB BLD-MCNC: 13.9 G/DL (ref 12–17)
HGB UR QL STRIP.AUTO: NEGATIVE
IMM GRANULOCYTES # BLD AUTO: 0.04 THOUSAND/UL (ref 0–0.2)
IMM GRANULOCYTES NFR BLD AUTO: 1 % (ref 0–2)
KETONES UR STRIP-MCNC: NEGATIVE MG/DL
LEUKOCYTE ESTERASE UR QL STRIP: NEGATIVE
LIPASE SERPL-CCNC: 52 U/L (ref 11–82)
LYMPHOCYTES # BLD AUTO: 2.26 THOUSANDS/ÂΜL (ref 0.6–4.47)
LYMPHOCYTES NFR BLD AUTO: 30 % (ref 14–44)
MCH RBC QN AUTO: 29.3 PG (ref 26.8–34.3)
MCHC RBC AUTO-ENTMCNC: 33.9 G/DL (ref 31.4–37.4)
MCV RBC AUTO: 86 FL (ref 82–98)
MONOCYTES # BLD AUTO: 0.55 THOUSAND/ÂΜL (ref 0.17–1.22)
MONOCYTES NFR BLD AUTO: 7 % (ref 4–12)
NEUTROPHILS # BLD AUTO: 4.5 THOUSANDS/ÂΜL (ref 1.85–7.62)
NEUTS SEG NFR BLD AUTO: 60 % (ref 43–75)
NITRITE UR QL STRIP: NEGATIVE
NRBC BLD AUTO-RTO: 0 /100 WBCS
PH UR STRIP.AUTO: 6.5 [PH]
PLATELET # BLD AUTO: 212 THOUSANDS/UL (ref 149–390)
PMV BLD AUTO: 8.9 FL (ref 8.9–12.7)
POTASSIUM SERPL-SCNC: 4 MMOL/L (ref 3.5–5.3)
PROT SERPL-MCNC: 7.3 G/DL (ref 6.4–8.4)
PROT UR STRIP-MCNC: NEGATIVE MG/DL
RBC # BLD AUTO: 4.75 MILLION/UL (ref 3.88–5.62)
SODIUM SERPL-SCNC: 135 MMOL/L (ref 135–147)
SP GR UR STRIP.AUTO: 1.01 (ref 1–1.03)
UROBILINOGEN UR QL STRIP.AUTO: 0.2 E.U./DL
WBC # BLD AUTO: 7.47 THOUSAND/UL (ref 4.31–10.16)

## 2024-03-21 PROCEDURE — 96374 THER/PROPH/DIAG INJ IV PUSH: CPT

## 2024-03-21 PROCEDURE — 36415 COLL VENOUS BLD VENIPUNCTURE: CPT | Performed by: EMERGENCY MEDICINE

## 2024-03-21 PROCEDURE — 99284 EMERGENCY DEPT VISIT MOD MDM: CPT | Performed by: EMERGENCY MEDICINE

## 2024-03-21 PROCEDURE — 83690 ASSAY OF LIPASE: CPT | Performed by: EMERGENCY MEDICINE

## 2024-03-21 PROCEDURE — 96375 TX/PRO/DX INJ NEW DRUG ADDON: CPT

## 2024-03-21 PROCEDURE — 96361 HYDRATE IV INFUSION ADD-ON: CPT

## 2024-03-21 PROCEDURE — 85025 COMPLETE CBC W/AUTO DIFF WBC: CPT | Performed by: EMERGENCY MEDICINE

## 2024-03-21 PROCEDURE — 81003 URINALYSIS AUTO W/O SCOPE: CPT | Performed by: EMERGENCY MEDICINE

## 2024-03-21 PROCEDURE — 80053 COMPREHEN METABOLIC PANEL: CPT | Performed by: EMERGENCY MEDICINE

## 2024-03-21 PROCEDURE — 99284 EMERGENCY DEPT VISIT MOD MDM: CPT

## 2024-03-21 RX ORDER — ONDANSETRON 4 MG/1
4 TABLET, ORALLY DISINTEGRATING ORAL EVERY 6 HOURS PRN
Qty: 20 TABLET | Refills: 0 | Status: SHIPPED | OUTPATIENT
Start: 2024-03-21

## 2024-03-21 RX ORDER — FAMOTIDINE 10 MG/ML
20 INJECTION, SOLUTION INTRAVENOUS ONCE
Status: COMPLETED | OUTPATIENT
Start: 2024-03-21 | End: 2024-03-21

## 2024-03-21 RX ORDER — ONDANSETRON 2 MG/ML
4 INJECTION INTRAMUSCULAR; INTRAVENOUS ONCE
Status: COMPLETED | OUTPATIENT
Start: 2024-03-21 | End: 2024-03-21

## 2024-03-21 RX ADMIN — SODIUM CHLORIDE 1000 ML: 0.9 INJECTION, SOLUTION INTRAVENOUS at 17:17

## 2024-03-21 RX ADMIN — FAMOTIDINE 20 MG: 10 INJECTION, SOLUTION INTRAVENOUS at 17:16

## 2024-03-21 RX ADMIN — ONDANSETRON 4 MG: 2 INJECTION INTRAMUSCULAR; INTRAVENOUS at 17:16

## 2024-03-21 NOTE — ED PROVIDER NOTES
History  Chief Complaint   Patient presents with    Nausea     PT c/o severe nausea since last night and mid abdominal pain for the last couple of days.     Patient is a 63-year-old male presenting to the emergency department complaining of nausea starting yesterday evening after taking 3 of his medications including Keflex which was recently prescribed for UTI all at once, he has been having some epigastric abdominal discomfort as well, denies any vomiting, no fevers, no diarrhea        Prior to Admission Medications   Prescriptions Last Dose Informant Patient Reported? Taking?   BD Pen Needle Micro U/F 32G X 6 MM MISC   Yes No   Sig: OZEMPIC PEN NEEDLES INJECT WEEKLY   Cholecalciferol 25 MCG (1000 UT) CHEW   Yes No   Creon 31515-134370 units CPEP   Yes No   Sig: TAKE 2 CAPS BY MOUTH WITH EACH MEAL AND 1 CAP WITH SNACKS   Easy Touch Pen Needles 31G X 8 MM MISC   Yes No   Empagliflozin (JARDIANCE PO)   Yes No   Patient not taking: Reported on 6/28/2023   Lactobacillus TABS   Yes No   Sig: Take by mouth   Lancets (OneTouch Delica Plus [id number]) MISC   Yes No   Sig: TEST TWICE A DAY   MAGNESIUM PO   Yes No   OneTouch Delica Lancets 33G MISC   Yes No   OneTouch Ultra test strip   Yes No   Sig: TEST TWICE A DAY   Semaglutide (OZEMPIC, 1 MG/DOSE, SC)   Yes No   Silodosin 4 MG CAPS   Yes No   Sig: Take 1 capsule by mouth daily   Silodosin 8 MG CAPS   Yes No   Sig: Take by mouth   apixaban (ELIQUIS) 5 mg   Yes No   Sig: Take 5 mg by mouth 2 (two) times a day   dexlansoprazole (DEXILANT) 60 MG capsule   Yes No   Sig: Take 1 Cap by mouth daily.   ergocalciferol (ERGOCALCIFEROL) 1.25 MG (38402 UT) capsule   Yes No   Sig: Take 50,000 Units by mouth   furosemide (LASIX) 20 mg tablet   Yes No   Sig: Take by mouth every 3 (three) days   insulin glargine (LANTUS) 100 units/mL subcutaneous injection   Yes No   Sig: Inject 25 Units under the skin 15 in morning and 10 at night   levothyroxine 50 mcg tablet   Yes No   Sig: Take 50  mcg by mouth daily   lisinopril (ZESTRIL) 10 mg tablet   Yes No   loratadine (CLARITIN) 10 mg tablet   Yes No   Sig: Take by mouth   magnesium (MAGTAB) 84 MG (7MEQ) TBCR   No No   Sig: Take 1 tablet (84 mg total) by mouth daily for 5 days   Patient not taking: Reported on 6/28/2023   meclizine (ANTIVERT) 25 mg tablet   Yes No   Sig: Take by mouth   niacin 500 mg tablet   Yes No   Patient not taking: Reported on 6/28/2023   oxyCODONE (ROXICODONE) 15 mg immediate release tablet   Yes No   rOPINIRole (REQUIP) 4 mg tablet   Yes No   Sig: Take 4 mg by mouth daily   simvastatin (ZOCOR) 40 mg tablet   Yes No   Sig: Take by mouth   tamsulosin (FLOMAX) 0.4 mg   Yes No   Sig: Take by mouth   venlafaxine (EFFEXOR-XR) 150 mg 24 hr capsule   Yes No   Sig: Take by mouth      Facility-Administered Medications: None       Past Medical History:   Diagnosis Date    Acute renal failure (ARF) (HCC)     BPH (benign prostatic hyperplasia)     Chronic pancreatitis (HCC)     Diabetes mellitus (HCC)     Disease of thyroid gland     GERD (gastroesophageal reflux disease)     High cholesterol     Hypertension     IBS (irritable bowel syndrome)     Obstructive jaundice     Pancreatic necrosis        Past Surgical History:   Procedure Laterality Date    ABDOMINAL SURGERY      CHOLECYSTECTOMY      PANCREATIC CYST DRAINAGE         Family History   Problem Relation Age of Onset    No Known Problems Mother     Cancer Father      I have reviewed and agree with the history as documented.    E-Cigarette/Vaping    E-Cigarette Use Never User      E-Cigarette/Vaping Substances     Social History     Tobacco Use    Smoking status: Never    Smokeless tobacco: Never   Vaping Use    Vaping status: Never Used   Substance Use Topics    Alcohol use: Never    Drug use: Never       Review of Systems   Constitutional: Negative.    HENT: Negative.     Eyes: Negative.    Respiratory: Negative.     Cardiovascular: Negative.    Gastrointestinal:  Positive for  abdominal pain and nausea.   Endocrine: Negative.    Genitourinary: Negative.    Musculoskeletal: Negative.    Skin: Negative.    Allergic/Immunologic: Negative.    Neurological: Negative.    Hematological: Negative.    Psychiatric/Behavioral: Negative.         Physical Exam  Physical Exam  Constitutional:       Appearance: He is well-developed.   HENT:      Head: Normocephalic and atraumatic.   Eyes:      Pupils: Pupils are equal, round, and reactive to light.   Cardiovascular:      Rate and Rhythm: Normal rate and regular rhythm.   Pulmonary:      Breath sounds: Normal breath sounds.   Abdominal:      Palpations: Abdomen is soft.      Tenderness: There is abdominal tenderness in the epigastric area.   Musculoskeletal:         General: Normal range of motion.      Cervical back: Normal range of motion and neck supple.   Skin:     General: Skin is warm and dry.   Neurological:      Mental Status: He is alert.   Psychiatric:         Behavior: Behavior normal.         Vital Signs  ED Triage Vitals [03/21/24 1642]   Temperature Pulse Respirations Blood Pressure SpO2   (!) 97.2 °F (36.2 °C) 69 16 134/82 100 %      Temp Source Heart Rate Source Patient Position - Orthostatic VS BP Location FiO2 (%)   Temporal Monitor Sitting Right arm --      Pain Score       --           Vitals:    03/21/24 1642   BP: 134/82   Pulse: 69   Patient Position - Orthostatic VS: Sitting         Visual Acuity      ED Medications  Medications   sodium chloride 0.9 % bolus 1,000 mL (0 mL Intravenous Stopped 3/21/24 1902)   ondansetron (ZOFRAN) injection 4 mg (4 mg Intravenous Given 3/21/24 1716)   Famotidine (PF) (PEPCID) injection 20 mg (20 mg Intravenous Given 3/21/24 1716)       Diagnostic Studies  Results Reviewed       Procedure Component Value Units Date/Time    UA w Reflex to Microscopic w Reflex to Culture [[id number]]  (Abnormal) Collected: 03/21/24 1825    Lab Status: Final result Specimen: Urine, Clean Catch Updated: 03/21/24 2111      Color, UA Yellow     Clarity, UA Clear     Specific Gravity, UA 1.010     pH, UA 6.5     Leukocytes, UA Negative     Nitrite, UA Negative     Protein, UA Negative mg/dl      Glucose,  (1/10%) mg/dl      Ketones, UA Negative mg/dl      Urobilinogen, UA 0.2 E.U./dl      Bilirubin, UA Negative     Occult Blood, UA Negative    Comprehensive metabolic panel [[id number]]  (Abnormal) Collected: 03/21/24 1713    Lab Status: Final result Specimen: Blood from Arm, Left Updated: 03/21/24 1803     Sodium 135 mmol/L      Potassium 4.0 mmol/L      Chloride 100 mmol/L      CO2 28 mmol/L      ANION GAP 7 mmol/L      BUN 15 mg/dL      Creatinine 0.90 mg/dL      Glucose 232 mg/dL      Calcium 9.5 mg/dL      AST 22 U/L      ALT 37 U/L      Alkaline Phosphatase 60 U/L      Total Protein 7.3 g/dL      Albumin 4.3 g/dL      Total Bilirubin 0.49 mg/dL      eGFR 89 ml/min/1.73sq m     Narrative:      [employer] guidelines for Chronic Kidney Disease (CKD):     Stage 1 with normal or high GFR (GFR > 90 mL/min/1.73 square meters)    Stage 2 Mild CKD (GFR = 60-89 mL/min/1.73 square meters)    Stage 3A Moderate CKD (GFR = 45-59 mL/min/1.73 square meters)    Stage 3B Moderate CKD (GFR = 30-44 mL/min/1.73 square meters)    Stage 4 Severe CKD (GFR = 15-29 mL/min/1.73 square meters)    Stage 5 End Stage CKD (GFR <15 mL/min/1.73 square meters)  Note: GFR calculation is accurate only with a steady state creatinine    Lipase [[id number]]  (Normal) Collected: 03/21/24 1713    Lab Status: Final result Specimen: Blood from Arm, Left Updated: 03/21/24 1803     Lipase 52 u/L     CBC and differential [[id number]] Collected: 03/21/24 1713    Lab Status: Final result Specimen: Blood from Arm, Left Updated: 03/21/24 1722     WBC 7.47 Thousand/uL      RBC 4.75 Million/uL      Hemoglobin 13.9 g/dL      Hematocrit 41.0 %      MCV 86 fL      MCH 29.3 pg      MCHC 33.9 g/dL      RDW 12.8 %      MPV 8.9 fL      Platelets 212 Thousands/uL       nRBC 0 /100 WBCs      Neutrophils Relative 60 %      Immature Grans % 1 %      Lymphocytes Relative 30 %      Monocytes Relative 7 %      Eosinophils Relative 1 %      Basophils Relative 1 %      Neutrophils Absolute 4.50 Thousands/µL      Absolute Immature Grans 0.04 Thousand/uL      Absolute Lymphocytes 2.26 Thousands/µL      Absolute Monocytes 0.55 Thousand/µL      Eosinophils Absolute 0.07 Thousand/µL      Basophils Absolute 0.05 Thousands/µL                    No orders to display              Procedures  Procedures         ED Course  ED Course as of 03/21/24 1912   Thu Mar 21, 2024   1911 Patient reports feeling much better on reevaluation, laboratory findings discussed at bedside, relatively unremarkable except for hyperglycemia and glucosuria, patient advised close follow-up with PCP or return if symptoms worsen, patient acknowledges understanding and agreement with this plan                                             Medical Decision Making  Abdominal exam without peritoneal signs.  No evidence of acute abdomen at this time.  Well-appearing.  Given work-up, low suspicion for acute hepatobiliary disease (including acute cholecystitis or cholangitis), acute pancreatitis (negative lipase), PUD (including gastric perforation), acute infectious processes (pneumonia, hepatitis, pyelonephritis), acute appendicitis, vascular catastrophe, bowel obstruction, viscus perforation, ovarian/testicular torsion, or diverticulitis.  Presentation not consistent with other acute emergent causes of abdominal pain at this time.    Problems Addressed:  Abdominal pain: acute illness or injury  Nausea: acute illness or injury    Amount and/or Complexity of Data Reviewed  Labs: ordered.    Risk  OTC drugs.  Prescription drug management.             Disposition  Final diagnoses:   Abdominal pain   Nausea     Time reflects when diagnosis was documented in both MDM as applicable and the Disposition within this note       Time User  Action Codes Description Comment    3/21/2024  7:00 PM Hilda Sumner Add [R10.9] Abdominal pain     3/21/2024  7:00 PM Hilda Sumner Add [R11.0] Nausea           ED Disposition       ED Disposition   Discharge    Condition   Stable    Date/Time   Thu Mar 21, 2024  7:00 PM    Comment   [place] SUSIE Varner discharge to home/self care.                   Follow-up Information       Follow up With Specialties Details Why Contact Info    Woodrow Miller, DO Internal Medicine In 2 days  [address] [facility] [id number] [phone]              Discharge Medication List as of 3/21/2024  7:01 PM        START taking these medications    Details   ondansetron (Zofran ODT) 4 mg disintegrating tablet Take 1 tablet (4 mg total) by mouth every 6 (six) hours as needed for nausea or vomiting, Starting Thu 3/21/2024, Normal           CONTINUE these medications which have NOT CHANGED    Details   apixaban (ELIQUIS) 5 mg Take 5 mg by mouth 2 (two) times a day, Historical Med      !! BD Pen Needle Micro U/F 32G X 6 MM MISC OZEMPIC PEN NEEDLES INJECT WEEKLY, Historical Med      Cholecalciferol 25 MCG (1000 UT) CHEW Historical Med      Creon 29012-309307 units CPEP TAKE 2 CAPS BY MOUTH WITH EACH MEAL AND 1 CAP WITH SNACKS, Historical Med      dexlansoprazole (DEXILANT) 60 MG capsule Take 1 Cap by mouth daily., Historical Med      !! Easy Touch Pen Needles 31G X 8 MM MISC Starting Mon 7/11/2022, Historical Med      Empagliflozin (JARDIANCE PO) Historical Med      ergocalciferol (ERGOCALCIFEROL) 1.25 MG (48773 UT) capsule Take 50,000 Units by mouth, Historical Med      furosemide (LASIX) 20 mg tablet Take by mouth every 3 (three) days, Historical Med      insulin glargine (LANTUS) 100 units/mL subcutaneous injection Inject 25 Units under the skin 15 in morning and 10 at night, Historical Med      Lactobacillus TABS Take by mouth, Historical Med      !! Lancets (OneTouch Delica Plus [id number]) MISC TEST TWICE A DAY, Historical Med       levothyroxine 50 mcg tablet Take 50 mcg by mouth daily, Historical Med      lisinopril (ZESTRIL) 10 mg tablet Starting Thu 8/4/2022, Historical Med      loratadine (CLARITIN) 10 mg tablet Take by mouth, Starting Tue 9/1/2015, Historical Med      magnesium (MAGTAB) 84 MG (7MEQ) TBCR Take 1 tablet (84 mg total) by mouth daily for 5 days, Starting Sat 3/7/2020, Until Wed 6/28/2023, Normal      MAGNESIUM PO Historical Med      meclizine (ANTIVERT) 25 mg tablet Take by mouth, Historical Med      niacin 500 mg tablet Historical Med      !! OneTouch Delica Lancets 33G MISC Historical Med      OneTouch Ultra test strip TEST TWICE A DAY, Historical Med      oxyCODONE (ROXICODONE) 15 mg immediate release tablet Starting Fri 9/2/2022, Historical Med      rOPINIRole (REQUIP) 4 mg tablet Take 4 mg by mouth daily, Starting Sun 8/28/2022, Historical Med      Semaglutide (OZEMPIC, 1 MG/DOSE, SC) Historical Med      !! Silodosin 4 MG CAPS Take 1 capsule by mouth daily, Starting Wed 6/22/2022, Historical Med      !! Silodosin 8 MG CAPS Take by mouth, Starting Wed 3/30/2016, Historical Med      simvastatin (ZOCOR) 40 mg tablet Take by mouth, Historical Med      tamsulosin (FLOMAX) 0.4 mg Take by mouth, Historical Med      venlafaxine (EFFEXOR-XR) 150 mg 24 hr capsule Take by mouth, Historical Med       !! - Potential duplicate medications found. Please discuss with provider.          No discharge procedures on file.    PDMP Review       None            ED Provider  Electronically Signed by             Hilda Sumner DO  03/21/24 1912

## 2024-08-09 ENCOUNTER — OPTICAL OFFICE (OUTPATIENT)
Dept: URBAN - NONMETROPOLITAN AREA CLINIC 4 | Facility: CLINIC | Age: 64
Setting detail: OPHTHALMOLOGY
End: 2024-08-09

## 2024-08-09 DIAGNOSIS — H52.03: ICD-10-CM

## 2024-08-09 PROCEDURE — V2799T TAXABLE VISION SERVICE MISCELLANEOUS: Performed by: OPTOMETRIST

## 2024-08-14 ENCOUNTER — DOCTOR'S OFFICE (OUTPATIENT)
Dept: URBAN - NONMETROPOLITAN AREA CLINIC 1 | Facility: CLINIC | Age: 64
Setting detail: OPHTHALMOLOGY
End: 2024-08-14
Payer: COMMERCIAL

## 2024-08-14 DIAGNOSIS — H18.793: ICD-10-CM

## 2024-08-14 DIAGNOSIS — H10.13: ICD-10-CM

## 2024-08-14 DIAGNOSIS — H04.123: ICD-10-CM

## 2024-08-14 DIAGNOSIS — H40.013: ICD-10-CM

## 2024-08-14 DIAGNOSIS — H21.233: ICD-10-CM

## 2024-08-14 PROCEDURE — 99214 OFFICE O/P EST MOD 30 MIN: CPT | Performed by: OPHTHALMOLOGY

## 2024-08-14 PROCEDURE — 92133 CPTRZD OPH DX IMG PST SGM ON: CPT | Performed by: OPHTHALMOLOGY

## 2024-08-14 ASSESSMENT — LID EXAM ASSESSMENTS
OS_BLEPHARITIS: T
OD_BLEPHARITIS: T

## 2024-08-14 ASSESSMENT — CONFRONTATIONAL VISUAL FIELD TEST (CVF)
OS_FINDINGS: FULL
OD_FINDINGS: FULL

## 2025-02-17 ENCOUNTER — DOCTOR'S OFFICE (OUTPATIENT)
Dept: URBAN - NONMETROPOLITAN AREA CLINIC 1 | Facility: CLINIC | Age: 65
Setting detail: OPHTHALMOLOGY
End: 2025-02-17
Payer: COMMERCIAL

## 2025-02-17 DIAGNOSIS — H40.013: ICD-10-CM

## 2025-02-17 DIAGNOSIS — H10.13: ICD-10-CM

## 2025-02-17 DIAGNOSIS — H21.233: ICD-10-CM

## 2025-02-17 DIAGNOSIS — H04.123: ICD-10-CM

## 2025-02-17 DIAGNOSIS — H18.793: ICD-10-CM

## 2025-02-17 PROCEDURE — 92020 GONIOSCOPY: CPT | Performed by: OPHTHALMOLOGY

## 2025-02-17 PROCEDURE — 99213 OFFICE O/P EST LOW 20 MIN: CPT | Performed by: OPHTHALMOLOGY

## 2025-02-17 PROCEDURE — 92083 EXTENDED VISUAL FIELD XM: CPT | Performed by: OPHTHALMOLOGY

## 2025-02-17 PROCEDURE — 76514 ECHO EXAM OF EYE THICKNESS: CPT | Performed by: OPHTHALMOLOGY

## 2025-02-17 ASSESSMENT — KERATOMETRY
OS_K1POWER_DIOPTERS: 42.75
OD_K1POWER_DIOPTERS: 42.75
OS_K2POWER_DIOPTERS: 43.00
OS_AXISANGLE_DEGREES: 095
OD_K2POWER_DIOPTERS: 43.50
OD_AXISANGLE_DEGREES: 080

## 2025-02-17 ASSESSMENT — REFRACTION_AUTOREFRACTION
OD_AXIS: 100
OS_AXIS: 086
OS_CYLINDER: -1.00
OD_SPHERE: +0.75
OS_SPHERE: +0.75
OD_CYLINDER: -1.00

## 2025-02-17 ASSESSMENT — CORNEAL DYSTROPHY - POSTERIOR
OD_POSTERIORDYSTROPHY: T GUTTATA
OS_POSTERIORDYSTROPHY: T GUTTATA

## 2025-02-17 ASSESSMENT — PACHYMETRY
OS_CT_UM: 530
OS_CT_CORRECTION: 1
OD_CT_CORRECTION: 1
OD_CT_UM: 538

## 2025-02-17 ASSESSMENT — REFRACTION_CURRENTRX
OS_VPRISM_DIRECTION: BF
OD_VPRISM_DIRECTION: BF
OD_SPHERE: +0.50
OS_SPHERE: +0.75
OD_ADD: +2.75
OS_AXIS: 090
OS_OVR_VA: 20/
OD_OVR_VA: 20/
OD_AXIS: 100
OD_CYLINDER: -0.75
OS_ADD: +2.75
OS_CYLINDER: -0.75

## 2025-02-17 ASSESSMENT — REFRACTION_MANIFEST
OD_CYLINDER: -0.75
OD_AXIS: 105
OS_ADD: +2.75
OS_VA2: 20/25-2
OS_CYLINDER: -0.75
OS_SPHERE: +0.75
OS_VA1: 20/25-2
OD_SPHERE: +0.75
OD_ADD: +2.75
OS_AXIS: 085
OD_VA2: 20/25
OD_VA1: 20/25

## 2025-02-17 ASSESSMENT — PUNCTA - ASSESSMENT
OS_PUNCTA: LLL MED
OD_PUNCTA: RLL MED

## 2025-02-17 ASSESSMENT — VISUAL ACUITY
OS_BCVA: 20/20
OD_BCVA: 20/20-1

## 2025-02-17 ASSESSMENT — TONOMETRY
OD_IOP_MMHG: 16
OS_IOP_MMHG: 16

## 2025-02-17 ASSESSMENT — CONFRONTATIONAL VISUAL FIELD TEST (CVF)
OS_FINDINGS: FULL
OD_FINDINGS: FULL

## 2025-08-29 ENCOUNTER — DOCTOR'S OFFICE (OUTPATIENT)
Dept: URBAN - NONMETROPOLITAN AREA CLINIC 1 | Facility: CLINIC | Age: 65
Setting detail: OPHTHALMOLOGY
End: 2025-08-29
Payer: COMMERCIAL

## 2025-08-29 DIAGNOSIS — H35.373: ICD-10-CM

## 2025-08-29 DIAGNOSIS — H04.123: ICD-10-CM

## 2025-08-29 DIAGNOSIS — H10.13: ICD-10-CM

## 2025-08-29 DIAGNOSIS — H25.13: ICD-10-CM

## 2025-08-29 DIAGNOSIS — H18.793: ICD-10-CM

## 2025-08-29 DIAGNOSIS — H40.013: ICD-10-CM

## 2025-08-29 DIAGNOSIS — E11.9: ICD-10-CM

## 2025-08-29 DIAGNOSIS — H21.233: ICD-10-CM

## 2025-08-29 PROCEDURE — 92133 CPTRZD OPH DX IMG PST SGM ON: CPT | Performed by: OPHTHALMOLOGY

## 2025-08-29 PROCEDURE — 92014 COMPRE OPH EXAM EST PT 1/>: CPT | Performed by: OPHTHALMOLOGY

## 2025-08-29 ASSESSMENT — TONOMETRY
OS_IOP_MMHG: 15
OD_IOP_MMHG: 15

## 2025-08-29 ASSESSMENT — VISUAL ACUITY
OD_BCVA: 20/30
OS_BCVA: 20/30+2

## 2025-08-29 ASSESSMENT — REFRACTION_MANIFEST
OS_VA1: 20/25-2
OS_SPHERE: +0.75
OS_VA2: 20/25-2
OS_CYLINDER: -0.75
OS_AXIS: 085
OD_VA2: 20/25
OD_VA1: 20/25
OD_AXIS: 105
OD_SPHERE: +0.75
OD_CYLINDER: -0.75
OS_ADD: +2.75
OD_ADD: +2.75

## 2025-08-29 ASSESSMENT — REFRACTION_CURRENTRX
OS_VPRISM_DIRECTION: BF
OS_AXIS: 087
OS_CYLINDER: -0.75
OD_CYLINDER: -0.75
OD_VPRISM_DIRECTION: BF
OD_OVR_VA: 20/
OS_OVR_VA: 20/
OS_ADD: +2.75
OS_SPHERE: +0.75
OD_SPHERE: +0.75
OD_ADD: +2.75
OD_AXIS: 109

## 2025-08-29 ASSESSMENT — KERATOMETRY
OD_AXISANGLE_DEGREES: 094
OD_K1POWER_DIOPTERS: 43.00
OS_AXISANGLE_DEGREES: 101
OD_K2POWER_DIOPTERS: 43.75
OS_K2POWER_DIOPTERS: 43.00
OS_K1POWER_DIOPTERS: 42.75

## 2025-08-29 ASSESSMENT — REFRACTION_AUTOREFRACTION
OD_AXIS: 116
OS_AXIS: 078
OS_CYLINDER: -1.00
OD_CYLINDER: -0.75
OS_SPHERE: +1.25
OD_SPHERE: +1.25

## 2025-08-29 ASSESSMENT — PACHYMETRY
OD_CT_CORRECTION: 1
OD_CT_UM: 538
OS_CT_UM: 530
OS_CT_CORRECTION: 1

## 2025-08-29 ASSESSMENT — CONFRONTATIONAL VISUAL FIELD TEST (CVF)
OS_FINDINGS: FULL
OD_FINDINGS: FULL

## 2025-08-29 ASSESSMENT — CORNEAL DYSTROPHY - POSTERIOR
OS_POSTERIORDYSTROPHY: T GUTTATA
OD_POSTERIORDYSTROPHY: T GUTTATA

## 2025-08-29 ASSESSMENT — PUNCTA - ASSESSMENT
OS_PUNCTA: LLL MED
OD_PUNCTA: RLL MED